# Patient Record
Sex: MALE | Race: WHITE | Employment: UNEMPLOYED | URBAN - METROPOLITAN AREA
[De-identification: names, ages, dates, MRNs, and addresses within clinical notes are randomized per-mention and may not be internally consistent; named-entity substitution may affect disease eponyms.]

---

## 2017-02-19 ENCOUNTER — APPOINTMENT (OUTPATIENT)
Dept: GENERAL RADIOLOGY | Age: 32
End: 2017-02-19
Payer: COMMERCIAL

## 2017-02-19 ENCOUNTER — HOSPITAL ENCOUNTER (EMERGENCY)
Age: 32
Discharge: HOME | End: 2017-02-20
Payer: COMMERCIAL

## 2017-02-19 VITALS
RESPIRATION RATE: 18 BRPM | HEART RATE: 91 BPM | HEIGHT: 71 IN | TEMPERATURE: 98 F | WEIGHT: 210 LBS | OXYGEN SATURATION: 95 % | BODY MASS INDEX: 29.4 KG/M2 | DIASTOLIC BLOOD PRESSURE: 101 MMHG | SYSTOLIC BLOOD PRESSURE: 162 MMHG

## 2017-02-19 DIAGNOSIS — S62.307A CLOSED NONDISPLACED FRACTURE OF FIFTH METACARPAL BONE OF LEFT HAND, UNSPECIFIED PORTION OF METACARPAL, INITIAL ENCOUNTER: Primary | ICD-10-CM

## 2017-02-19 PROCEDURE — 6370000000 HC RX 637 (ALT 250 FOR IP): Performed by: PHYSICIAN ASSISTANT

## 2017-02-19 PROCEDURE — 73130 X-RAY EXAM OF HAND: CPT

## 2017-02-19 PROCEDURE — 29125 APPL SHORT ARM SPLINT STATIC: CPT

## 2017-02-19 PROCEDURE — 99283 EMERGENCY DEPT VISIT LOW MDM: CPT

## 2017-02-19 RX ORDER — HYDROCODONE BITARTRATE AND ACETAMINOPHEN 5; 325 MG/1; MG/1
1-2 TABLET ORAL EVERY 6 HOURS PRN
Qty: 15 TABLET | Refills: 0 | Status: SHIPPED | OUTPATIENT
Start: 2017-02-19 | End: 2017-02-26

## 2017-02-19 RX ORDER — HYDROCODONE BITARTRATE AND ACETAMINOPHEN 5; 325 MG/1; MG/1
2 TABLET ORAL ONCE
Status: COMPLETED | OUTPATIENT
Start: 2017-02-19 | End: 2017-02-19

## 2017-02-19 RX ORDER — IBUPROFEN 800 MG/1
800 TABLET ORAL EVERY 8 HOURS PRN
Qty: 15 TABLET | Refills: 0 | Status: SHIPPED | OUTPATIENT
Start: 2017-02-19

## 2017-02-19 RX ADMIN — HYDROCODONE BITARTRATE AND ACETAMINOPHEN 2 TABLET: 5; 325 TABLET ORAL at 23:54

## 2017-02-19 ASSESSMENT — ENCOUNTER SYMPTOMS
ANAL BLEEDING: 0
VOMITING: 0
APNEA: 0
ABDOMINAL PAIN: 0
EYE DISCHARGE: 0
VOICE CHANGE: 0
NAUSEA: 0
BACK PAIN: 0
COUGH: 0
ABDOMINAL DISTENTION: 0
PHOTOPHOBIA: 0

## 2017-02-19 ASSESSMENT — PAIN DESCRIPTION - DESCRIPTORS: DESCRIPTORS: THROBBING

## 2017-02-19 ASSESSMENT — PAIN SCALES - GENERAL
PAINLEVEL_OUTOF10: 9
PAINLEVEL_OUTOF10: 10

## 2017-02-19 ASSESSMENT — PAIN DESCRIPTION - LOCATION: LOCATION: HAND

## 2017-02-19 ASSESSMENT — PAIN DESCRIPTION - ORIENTATION: ORIENTATION: LEFT

## 2017-02-19 ASSESSMENT — PAIN DESCRIPTION - PAIN TYPE: TYPE: ACUTE PAIN

## 2017-08-22 ENCOUNTER — HOSPITAL ENCOUNTER (EMERGENCY)
Age: 32
Discharge: HOME | End: 2017-08-22
Attending: EMERGENCY MEDICINE
Payer: MEDICAID

## 2017-08-22 VITALS
SYSTOLIC BLOOD PRESSURE: 155 MMHG | HEART RATE: 78 BPM | WEIGHT: 196 LBS | TEMPERATURE: 98.3 F | RESPIRATION RATE: 16 BRPM | HEIGHT: 71 IN | OXYGEN SATURATION: 97 % | DIASTOLIC BLOOD PRESSURE: 65 MMHG | BODY MASS INDEX: 27.44 KG/M2

## 2017-08-22 DIAGNOSIS — L70.0 ACNE VULGARIS: Primary | ICD-10-CM

## 2017-08-22 DIAGNOSIS — L02.92 DEEP FOLLICULITIS: ICD-10-CM

## 2017-08-22 PROCEDURE — 99282 EMERGENCY DEPT VISIT SF MDM: CPT

## 2017-08-22 RX ORDER — ADAPALENE AND BENZOYL PEROXIDE .1; 2.5 G/100G; G/100G
1 GEL TOPICAL 2 TIMES DAILY
Qty: 45 G | Refills: 0 | Status: SHIPPED | OUTPATIENT
Start: 2017-08-22

## 2017-08-22 RX ORDER — DOXYCYCLINE 100 MG/1
100 TABLET ORAL 2 TIMES DAILY
Qty: 20 TABLET | Refills: 0 | Status: SHIPPED | OUTPATIENT
Start: 2017-08-22 | End: 2017-09-01

## 2017-08-22 ASSESSMENT — ENCOUNTER SYMPTOMS
BACK PAIN: 0
NAUSEA: 0
SHORTNESS OF BREATH: 0
CHEST TIGHTNESS: 0
VOMITING: 0
BLOOD IN STOOL: 0
WHEEZING: 0
DIARRHEA: 0
COUGH: 0
TROUBLE SWALLOWING: 0
EYE PAIN: 0
ABDOMINAL PAIN: 0
RHINORRHEA: 0

## 2017-08-22 ASSESSMENT — PAIN DESCRIPTION - PROGRESSION: CLINICAL_PROGRESSION: GRADUALLY WORSENING

## 2017-08-22 ASSESSMENT — PAIN DESCRIPTION - DESCRIPTORS: DESCRIPTORS: BURNING;PRESSURE

## 2017-08-22 ASSESSMENT — PAIN SCALES - GENERAL: PAINLEVEL_OUTOF10: 6

## 2017-08-22 ASSESSMENT — PAIN DESCRIPTION - FREQUENCY: FREQUENCY: CONTINUOUS

## 2017-08-22 ASSESSMENT — PAIN DESCRIPTION - LOCATION: LOCATION: BACK

## 2017-08-22 ASSESSMENT — PAIN DESCRIPTION - ONSET: ONSET: PROGRESSIVE

## 2017-10-19 ENCOUNTER — APPOINTMENT (OUTPATIENT)
Dept: CT IMAGING | Age: 32
End: 2017-10-19
Payer: MEDICAID

## 2017-10-19 ENCOUNTER — HOSPITAL ENCOUNTER (EMERGENCY)
Age: 32
Discharge: HOME | End: 2017-10-19
Attending: EMERGENCY MEDICINE
Payer: MEDICAID

## 2017-10-19 VITALS
HEIGHT: 72 IN | DIASTOLIC BLOOD PRESSURE: 87 MMHG | RESPIRATION RATE: 16 BRPM | SYSTOLIC BLOOD PRESSURE: 144 MMHG | HEART RATE: 83 BPM | WEIGHT: 194 LBS | TEMPERATURE: 97.8 F | OXYGEN SATURATION: 98 % | BODY MASS INDEX: 26.28 KG/M2

## 2017-10-19 DIAGNOSIS — G44.209 TENSION HEADACHE: Primary | ICD-10-CM

## 2017-10-19 PROCEDURE — 70450 CT HEAD/BRAIN W/O DYE: CPT

## 2017-10-19 PROCEDURE — 2580000003 HC RX 258: Performed by: EMERGENCY MEDICINE

## 2017-10-19 PROCEDURE — 99283 EMERGENCY DEPT VISIT LOW MDM: CPT

## 2017-10-19 PROCEDURE — 6360000002 HC RX W HCPCS: Performed by: EMERGENCY MEDICINE

## 2017-10-19 PROCEDURE — 96374 THER/PROPH/DIAG INJ IV PUSH: CPT

## 2017-10-19 PROCEDURE — 96375 TX/PRO/DX INJ NEW DRUG ADDON: CPT

## 2017-10-19 RX ORDER — SODIUM CHLORIDE 9 MG/ML
INJECTION, SOLUTION INTRAVENOUS CONTINUOUS
Status: DISCONTINUED | OUTPATIENT
Start: 2017-10-19 | End: 2017-10-19 | Stop reason: HOSPADM

## 2017-10-19 RX ORDER — MORPHINE SULFATE 4 MG/ML
4 INJECTION, SOLUTION INTRAMUSCULAR; INTRAVENOUS
Status: DISCONTINUED | OUTPATIENT
Start: 2017-10-19 | End: 2017-10-19 | Stop reason: HOSPADM

## 2017-10-19 RX ORDER — ISOTRETINOIN 20 MG/1
20 CAPSULE ORAL 2 TIMES DAILY
COMMUNITY

## 2017-10-19 RX ORDER — PREDNISONE 20 MG/1
20 TABLET ORAL DAILY
COMMUNITY

## 2017-10-19 RX ORDER — ONDANSETRON 2 MG/ML
4 INJECTION INTRAMUSCULAR; INTRAVENOUS ONCE
Status: COMPLETED | OUTPATIENT
Start: 2017-10-19 | End: 2017-10-19

## 2017-10-19 RX ORDER — KETOROLAC TROMETHAMINE 30 MG/ML
30 INJECTION, SOLUTION INTRAMUSCULAR; INTRAVENOUS ONCE
Status: COMPLETED | OUTPATIENT
Start: 2017-10-19 | End: 2017-10-19

## 2017-10-19 RX ORDER — 0.9 % SODIUM CHLORIDE 0.9 %
500 INTRAVENOUS SOLUTION INTRAVENOUS ONCE
Status: COMPLETED | OUTPATIENT
Start: 2017-10-19 | End: 2017-10-19

## 2017-10-19 RX ADMIN — SODIUM CHLORIDE 500 ML: 9 INJECTION, SOLUTION INTRAVENOUS at 13:47

## 2017-10-19 RX ADMIN — KETOROLAC TROMETHAMINE 30 MG: 30 INJECTION, SOLUTION INTRAMUSCULAR at 13:46

## 2017-10-19 RX ADMIN — SODIUM CHLORIDE: 9 INJECTION, SOLUTION INTRAVENOUS at 14:22

## 2017-10-19 RX ADMIN — ONDANSETRON 4 MG: 2 INJECTION INTRAMUSCULAR; INTRAVENOUS at 13:46

## 2017-10-19 ASSESSMENT — ENCOUNTER SYMPTOMS
SORE THROAT: 0
CHEST TIGHTNESS: 0
EYES NEGATIVE: 1
SINUS PAIN: 0
ABDOMINAL PAIN: 0
ABDOMINAL DISTENTION: 0
DIARRHEA: 0
NAUSEA: 0
SHORTNESS OF BREATH: 0
COUGH: 0
VOMITING: 0
BLOOD IN STOOL: 0
EYE PAIN: 0
EYE DISCHARGE: 0
BACK PAIN: 0
RESPIRATORY NEGATIVE: 1
GASTROINTESTINAL NEGATIVE: 1
WHEEZING: 0

## 2017-10-19 ASSESSMENT — PAIN SCALES - GENERAL: PAINLEVEL_OUTOF10: 10

## 2017-10-19 ASSESSMENT — PAIN DESCRIPTION - LOCATION: LOCATION: HEAD

## 2017-10-19 ASSESSMENT — PAIN DESCRIPTION - DESCRIPTORS: DESCRIPTORS: ACHING

## 2017-10-19 ASSESSMENT — PAIN DESCRIPTION - FREQUENCY: FREQUENCY: CONTINUOUS

## 2017-10-19 ASSESSMENT — PAIN DESCRIPTION - PAIN TYPE: TYPE: ACUTE PAIN

## 2017-10-19 NOTE — ED TRIAGE NOTES
Patient presents to ED with c/o head pressure that radiates into right eye.  States that it has been going on for 4 days now

## 2017-10-19 NOTE — ED PROVIDER NOTES
[address] [place] [address] ED  eMERGENCY dEPARTMENT eNCOUnter      Pt Name: Parish Bolden  MRN: [id number]  [place] 1985  Date of evaluation: 10/19/2017  Provider: Cayetano Alford, [address] [place] [address]       Chief Complaint   Patient presents with    Headache     started about 4 days ago         HISTORY OF PRESENT ILLNESS   (Location/Symptom, Timing/Onset, Context/Setting, Quality, Duration, Modifying Factors, Severity)  Note limiting factors. Parish Bolden is a 28 y.o. male who presents to the emergency department Had pressure or headache most sides of his head is very sensitive to touch his scalp on the temporal region and around his ear to the base of his head. Bilaterally. The patient is on Accutane he has no visual symptomatology. He is also on prednisone for significant acne vulgaris. He is seeing a dermatologist who I did speak with on the phone. The only concern would've been for pseudotumor cerebri however he has no visual symptoms and he has no papilledema on my exam of his eyes funduscopic leave. HPI    Nursing Notes were reviewed. REVIEW OF SYSTEMS    (2-9 systems for level 4, 10 or more for level 5)     Review of Systems   Constitutional: Negative. Negative for chills and fever. HENT: Negative. Negative for ear pain, sinus pain and sore throat. Eyes: Negative. Negative for pain and discharge. Respiratory: Negative. Negative for cough, chest tightness, shortness of breath and wheezing. Cardiovascular: Negative. Negative for chest pain, palpitations and leg swelling. Gastrointestinal: Negative. Negative for abdominal distention, abdominal pain, blood in stool, diarrhea, nausea and vomiting. Endocrine: Negative. Negative for polydipsia and polyuria. Genitourinary: Negative. Negative for difficulty urinating, dysuria, flank pain, frequency, hematuria and urgency. Musculoskeletal: Negative. Negative for arthralgias, back pain, myalgias and neck pain.    Skin: Positive for rash. Negative for wound. Neurological: Positive for headaches. Negative for dizziness, seizures, syncope and weakness. Hematological: Negative. Negative for adenopathy. Does not bruise/bleed easily. Psychiatric/Behavioral: Negative. Negative for confusion, hallucinations, self-injury and suicidal ideas. All other systems reviewed and are negative. acne vulgaris  Sensitivity to scalp  Except as noted above the remainder of the review of systems was reviewed and negative. PAST MEDICAL HISTORY   History reviewed. No pertinent past medical history. SURGICAL HISTORY       Past Surgical History:   Procedure Laterality Date    HERNIA REPAIR           CURRENT MEDICATIONS       Previous Medications    ADAPALENE-BENZOYL PEROXIDE 0.1-2.5 % GEL    Apply 1 g topically 2 times daily    IBUPROFEN (ADVIL;MOTRIN) 800 MG TABLET    Take 1 tablet by mouth every 8 hours as needed for Pain    ISOTRETINOIN (ACCUTANE) 20 MG CHEMO CAPSULE    Take 20 mg by mouth 2 times daily    NAPROXEN (NAPROSYN) 500 MG TABLET    Take 1 tablet by mouth 2 times daily for 20 doses    PREDNISONE (DELTASONE) 20 MG TABLET    Take 20 mg by mouth daily       ALLERGIES     Review of patient's allergies indicates no known allergies. FAMILY HISTORY     History reviewed. No pertinent family history.        SOCIAL HISTORY       Social History     Social History    Marital status:      Spouse name: N/A    Number of children: N/A    Years of education: N/A     Social History Main Topics    Smoking status: Light Tobacco Smoker     Types: Cigarettes     Last attempt to quit: 12/12/2016    Smokeless tobacco: Never Used    Alcohol use 1.2 oz/week     2 Cans of beer per week      Comment: occasionally    Drug use: No    Sexual activity: Yes     Partners: Female     Other Topics Concern    None     Social History Narrative    None       SCREENINGS    Ezio Coma Scale  Eye Opening: Spontaneous  Best Verbal Response: Oriented  Best Motor Response: Obeys commands  [place] Coma Scale Score: 15        PHYSICAL EXAM    (up to 7 for level 4, 8 or more for level 5)     ED Triage Vitals [10/19/17 1254]   BP Temp Temp src Pulse Resp SpO2 Height Weight   (!) 147/72 97.8 °F (36.6 °C) -- 84 20 98 % 6' (1.829 m) 194 lb (88 kg)       Physical Exam   Constitutional: He is oriented to person, place, and time. He appears well-developed and well-nourished. HENT:   Head: Normocephalic and atraumatic. Eyes: Conjunctivae and EOM are normal. Pupils are equal, round, and reactive to light. Neck: Normal range of motion. Neck supple. Cardiovascular: Normal rate, regular rhythm, normal heart sounds and intact distal pulses. Exam reveals no friction rub. No murmur heard. Pulmonary/Chest: Effort normal and breath sounds normal. No respiratory distress. He has no wheezes. He has no rales. Abdominal: Soft. Bowel sounds are normal. He exhibits no distension. There is no tenderness. There is no rebound. Musculoskeletal: Normal range of motion. He exhibits no edema, tenderness or deformity. Neurological: He is alert and oriented to person, place, and time. No cranial nerve deficit. Coordination normal.   Skin: Skin is warm and dry. Rash noted. There is erythema. Psychiatric: He has a normal mood and affect. His behavior is normal.   Nursing note and vitals reviewed.  has significant acne vulgaris although he states this is better than what it had been before the Accutane and prednisone.     DIAGNOSTIC RESULTS     EKG: All EKG's are interpreted by the Emergency Department Physician who either signs or Co-signs this chart in the absence of a cardiologist.    None    RADIOLOGY:   Non-plain film images such as CT, Ultrasound and MRI are read by the radiologist. Plain radiographic images are visualized and preliminarily interpreted by the emergency physician with the below findings:    No acute intracranial pathology    Interpretation per the Radiologist below, if available at the time of this note:    CT Head WO Contrast   Final Result   NORMAL CT BRAIN WITHOUT CONTRAST. ED BEDSIDE ULTRASOUND:   Performed by ED Physician - none    LABS:  Labs Reviewed - No data to display    All other labs were within normal range or not returned as of this dictation. EMERGENCY DEPARTMENT COURSE and DIFFERENTIAL DIAGNOSIS/MDM:   Vitals:    Vitals:    10/19/17 1254   BP: (!) 147/72   Pulse: 84   Resp: 20   Temp: 97.8 °F (36.6 °C)   SpO2: 98%   Weight: 194 lb (88 kg)   Height: 6' (1.829 m)       Patient has CT negative of his head there is no acute intracranial pathology. The patient was treated for what I believe to be a tension cephalgia. The patient will have to stop the Accutane this is not pseudotumor cerebri. The patient will follow-up with his dermatologist in a week or 2 he is to call for that appointment. MDM    CRITICAL CARE TIME   Total Critical Care time was 0 minutes, excluding separately reportable procedures. There was a high probability of clinically significant/life threatening deterioration in the patient's condition which required my urgent intervention. CONSULTS:  None    PROCEDURES:  Unless otherwise noted below, none     Procedures    FINAL IMPRESSION      1.  Tension headache          DISPOSITION/PLAN   DISPOSITION     PATIENT REFERRED TO:  Elizabeth Silva MD  [address] [id number] [phone]    In 1 week        DISCHARGE MEDICATIONS:  New Prescriptions    No medications on file          (Please note that portions of this note were completed with a voice recognition program.  Efforts were made to edit the dictations but occasionally words are mis-transcribed.)    Tanner Koenig DO (electronically signed)  Attending Emergency Physician          Tanner Koenig DO  10/19/17 [address] [place] [address]  10/19/17 [facility]

## 2023-05-12 ENCOUNTER — HOSPITAL ENCOUNTER (EMERGENCY)
Age: 38
Discharge: LEFT BEFORE DISCHARGE | End: 2023-05-12
Payer: MEDICAID

## 2023-05-12 VITALS
WEIGHT: 185 LBS | HEIGHT: 72 IN | RESPIRATION RATE: 20 BRPM | TEMPERATURE: 98.5 F | BODY MASS INDEX: 25.06 KG/M2 | SYSTOLIC BLOOD PRESSURE: 132 MMHG | DIASTOLIC BLOOD PRESSURE: 93 MMHG | HEART RATE: 76 BPM | OXYGEN SATURATION: 95 %

## 2023-05-12 DIAGNOSIS — F10.920 ACUTE ALCOHOLIC INTOXICATION WITHOUT COMPLICATION (HCC): Primary | ICD-10-CM

## 2023-05-12 LAB
ALBUMIN SERPL-MCNC: 4.6 G/DL (ref 3.5–4.6)
ALP SERPL-CCNC: 219 U/L (ref 35–104)
ALT SERPL-CCNC: 161 U/L (ref 0–41)
AMPHETAMINES UR QL SCN>500 NG/ML: ABNORMAL
AMYLASE SERPL-CCNC: 85 U/L (ref 22–93)
ANION GAP SERPL CALCULATED.3IONS-SCNC: 13 MEQ/L (ref 9–15)
AST SERPL-CCNC: 255 U/L (ref 0–40)
BARBITURATES UR QL SCN>200 NG/ML: ABNORMAL
BASOPHILS # BLD: 0.1 K/UL (ref 0–0.1)
BASOPHILS NFR BLD: 1 % (ref 0.2–1.2)
BENZODIAZ UR QL SCN: ABNORMAL
BILIRUB SERPL-MCNC: 1 MG/DL (ref 0.2–0.7)
BILIRUB UR QL STRIP: NEGATIVE
BUN SERPL-MCNC: 8 MG/DL (ref 6–20)
CALCIUM SERPL-MCNC: 9.4 MG/DL (ref 8.5–9.9)
CHLORIDE SERPL-SCNC: 104 MEQ/L (ref 95–107)
CLARITY UR: CLEAR
CO2 SERPL-SCNC: 24 MEQ/L (ref 20–31)
COCAINE UR QL SCN: ABNORMAL
COLOR UR: YELLOW
CREAT SERPL-MCNC: 0.52 MG/DL (ref 0.7–1.2)
DRUG SCREEN COMMENT UR-IMP: ABNORMAL
EKG ATRIAL RATE: 84 BPM
EKG P AXIS: 28 DEGREES
EKG P-R INTERVAL: 116 MS
EKG Q-T INTERVAL: 382 MS
EKG QRS DURATION: 86 MS
EKG QTC CALCULATION (BAZETT): 451 MS
EKG R AXIS: 46 DEGREES
EKG T AXIS: 37 DEGREES
EKG VENTRICULAR RATE: 84 BPM
EOSINOPHIL # BLD: 0.1 K/UL (ref 0–0.5)
EOSINOPHIL NFR BLD: 1.6 % (ref 0.8–7)
ERYTHROCYTE [DISTWIDTH] IN BLOOD BY AUTOMATED COUNT: 14.7 % (ref 11.6–14.4)
ETHANOL PERCENT: 0.28 G/DL
ETHANOLAMINE SERPL-MCNC: 320 MG/DL (ref 0–0.08)
GLOBULIN SER CALC-MCNC: 3.7 G/DL (ref 2.3–3.5)
GLUCOSE SERPL-MCNC: 103 MG/DL (ref 70–99)
GLUCOSE UR STRIP-MCNC: NEGATIVE MG/DL
HCT VFR BLD AUTO: 44.1 % (ref 42–52)
HGB BLD-MCNC: 14.7 G/DL (ref 13.7–17.5)
HGB UR QL STRIP: NEGATIVE
IMM GRANULOCYTES # BLD: 0 K/UL
IMM GRANULOCYTES NFR BLD: 0.1 %
KETONES UR STRIP-MCNC: NEGATIVE MG/DL
LEUKOCYTE ESTERASE UR QL STRIP: NEGATIVE
LIPASE SERPL-CCNC: 40 U/L (ref 12–95)
LYMPHOCYTES # BLD: 2.7 K/UL (ref 1.3–3.6)
LYMPHOCYTES NFR BLD: 38.6 %
MAGNESIUM SERPL-MCNC: 2.1 MG/DL (ref 1.7–2.4)
MCH RBC QN AUTO: 31.9 PG (ref 25.7–32.2)
MCHC RBC AUTO-ENTMCNC: 33.3 % (ref 32.3–36.5)
MCV RBC AUTO: 95.7 FL (ref 79–92.2)
MONOCYTES # BLD: 0.5 K/UL (ref 0.3–0.8)
MONOCYTES NFR BLD: 7.2 % (ref 5.3–12.2)
NEUTROPHILS # BLD: 3.6 K/UL (ref 1.8–5.4)
NEUTS SEG NFR BLD: 51.5 % (ref 34–67.9)
NITRITE UR QL STRIP: NEGATIVE
OPIATES UR QL SCN: ABNORMAL
PCP UR QL SCN>25 NG/ML: ABNORMAL
PH UR STRIP: 6 [PH] (ref 5–9)
PLATELET # BLD AUTO: 209 K/UL (ref 163–337)
POTASSIUM SERPL-SCNC: 4.1 MEQ/L (ref 3.4–4.9)
PROT SERPL-MCNC: 8.3 G/DL (ref 6.3–8)
PROT UR STRIP-MCNC: NEGATIVE MG/DL
RBC # BLD AUTO: 4.61 M/UL (ref 4.63–6.08)
SODIUM SERPL-SCNC: 141 MEQ/L (ref 135–144)
SP GR UR STRIP: <=1.005 (ref 1–1.03)
THC UR QL SCN>50 NG/ML: POSITIVE
TRICYCLICS UR QL SCN: ABNORMAL
TROPONIN T SERPL-MCNC: <0.01 NG/ML (ref 0–0.01)
URINE REFLEX TO CULTURE: NORMAL
UROBILINOGEN UR STRIP-ACNC: 0.2 E.U./DL
WBC # BLD AUTO: 7.1 K/UL (ref 4.2–9)

## 2023-05-12 PROCEDURE — 96375 TX/PRO/DX INJ NEW DRUG ADDON: CPT

## 2023-05-12 PROCEDURE — 93005 ELECTROCARDIOGRAM TRACING: CPT

## 2023-05-12 PROCEDURE — 81003 URINALYSIS AUTO W/O SCOPE: CPT

## 2023-05-12 PROCEDURE — 84484 ASSAY OF TROPONIN QUANT: CPT

## 2023-05-12 PROCEDURE — 99284 EMERGENCY DEPT VISIT MOD MDM: CPT

## 2023-05-12 PROCEDURE — 82077 ASSAY SPEC XCP UR&BREATH IA: CPT

## 2023-05-12 PROCEDURE — 96365 THER/PROPH/DIAG IV INF INIT: CPT

## 2023-05-12 PROCEDURE — 2500000003 HC RX 250 WO HCPCS: Performed by: NURSE PRACTITIONER

## 2023-05-12 PROCEDURE — 6360000002 HC RX W HCPCS: Performed by: NURSE PRACTITIONER

## 2023-05-12 PROCEDURE — 80074 ACUTE HEPATITIS PANEL: CPT

## 2023-05-12 PROCEDURE — 80053 COMPREHEN METABOLIC PANEL: CPT

## 2023-05-12 PROCEDURE — 85025 COMPLETE CBC W/AUTO DIFF WBC: CPT

## 2023-05-12 PROCEDURE — 36415 COLL VENOUS BLD VENIPUNCTURE: CPT

## 2023-05-12 PROCEDURE — 80306 DRUG TEST PRSMV INSTRMNT: CPT

## 2023-05-12 PROCEDURE — 96366 THER/PROPH/DIAG IV INF ADDON: CPT

## 2023-05-12 PROCEDURE — 83690 ASSAY OF LIPASE: CPT

## 2023-05-12 PROCEDURE — 82150 ASSAY OF AMYLASE: CPT

## 2023-05-12 PROCEDURE — 83735 ASSAY OF MAGNESIUM: CPT

## 2023-05-12 PROCEDURE — 2580000003 HC RX 258: Performed by: NURSE PRACTITIONER

## 2023-05-12 RX ORDER — LORAZEPAM 2 MG/ML
0.5 INJECTION INTRAMUSCULAR ONCE
Status: DISCONTINUED | OUTPATIENT
Start: 2023-05-12 | End: 2023-05-12

## 2023-05-12 RX ORDER — LORAZEPAM 2 MG/ML
1 INJECTION INTRAMUSCULAR ONCE
Status: COMPLETED | OUTPATIENT
Start: 2023-05-12 | End: 2023-05-12

## 2023-05-12 RX ADMIN — FOLIC ACID: 5 INJECTION, SOLUTION INTRAMUSCULAR; INTRAVENOUS; SUBCUTANEOUS at 15:32

## 2023-05-12 RX ADMIN — LORAZEPAM 1 MG: 2 INJECTION INTRAMUSCULAR; INTRAVENOUS at 15:31

## 2023-05-12 ASSESSMENT — ENCOUNTER SYMPTOMS
SORE THROAT: 0
VOICE CHANGE: 0
VOMITING: 0
EYE PAIN: 0
CHEST TIGHTNESS: 0
EYE ITCHING: 0
STRIDOR: 0
EYE DISCHARGE: 0
COUGH: 0
SHORTNESS OF BREATH: 0
NAUSEA: 0
WHEEZING: 0
ALLERGIC/IMMUNOLOGIC NEGATIVE: 1
BLOOD IN STOOL: 0
CHOKING: 0
CONSTIPATION: 0
APNEA: 0
SINUS PRESSURE: 0
COLOR CHANGE: 0
ABDOMINAL PAIN: 0
PHOTOPHOBIA: 0
SINUS PAIN: 0
RHINORRHEA: 0
ABDOMINAL DISTENTION: 0
EYE REDNESS: 0
DIARRHEA: 0
BACK PAIN: 0
TROUBLE SWALLOWING: 0
FACIAL SWELLING: 0

## 2023-05-12 ASSESSMENT — PAIN SCALES - GENERAL: PAINLEVEL_OUTOF10: 8

## 2023-05-12 ASSESSMENT — PAIN - FUNCTIONAL ASSESSMENT: PAIN_FUNCTIONAL_ASSESSMENT: 0-10

## 2023-05-12 ASSESSMENT — PAIN DESCRIPTION - LOCATION: LOCATION: CHEST;OTHER (COMMENT)

## 2023-05-12 ASSESSMENT — PAIN DESCRIPTION - PAIN TYPE: TYPE: ACUTE PAIN;CHRONIC PAIN

## 2023-05-12 ASSESSMENT — PAIN DESCRIPTION - FREQUENCY: FREQUENCY: CONTINUOUS

## 2023-05-12 NOTE — ED TRIAGE NOTES
Pt to ER with c/o ETOH withdrawal, Pt states last use cocaine days ago, THC use daily, Pt states he called [facility] and they advised he go to the nearest facility to facilitate transfer to them. States last inpatient treatment was Nov. 2019. Pt denies suicidal or homicidal ideation. States last drink just prior to arrival. Pt states he drove himself to the ER.  Patient states he has been homeless since March 31st.

## 2023-05-12 NOTE — ED NOTES
[facility] contacted to initiate assistance to help facilitate patient with rehab per patient request. Spoke with Sacha Gilman and she informed me no one available at this time. She provided me with a contact number to Margareth from [facility] who could potentially help. After speaking with Margareth she is unable to provide support for patient to get into rehab but she did provide me with another  named Milton Brown from [facility] that should be able to help facilitate patient to transition into a rehab facility. Awaiting to hear back from Milton Brown at this time. Meanwhile a representative from [address] [place] [address] arrived and is currently working on care transition.      Ismael Matt RN  05/12/23 4365

## 2023-05-12 NOTE — ED PROVIDER NOTES
MCV 95.7 (*)     RDW 14.7 (*)     All other components within normal limits   COMPREHENSIVE METABOLIC PANEL - Abnormal; Notable for the following components:    Glucose 103 (*)     Creatinine 0.52 (*)     Total Protein 8.3 (*)     Total Bilirubin 1.0 (*)     Alkaline Phosphatase 219 (*)      (*)      (*)     Globulin 3.7 (*)     All other components within normal limits   URINE DRUG SCREEN, COMPREHENSIVE - Abnormal; Notable for the following components:    Cannabinoid Scrn, Ur POSITIVE (*)     All other components within normal limits   MAGNESIUM   LIPASE   AMYLASE   ETHANOL   URINALYSIS WITH REFLEX TO CULTURE   TROPONIN   HEPATITIS PANEL, ACUTE       All other labs were within normal range or not returned as of this dictation. EMERGENCY DEPARTMENT COURSE and DIFFERENTIAL DIAGNOSIS/MDM:   Vitals:    Vitals:    05/12/23 1605 05/12/23 1700 05/12/23 1800 05/12/23 1830   BP: 115/79 (!) 119/91 (!) 127/91 (!) 132/93   Pulse: 78 76 80 76   Resp:       Temp:       TempSrc:       SpO2: 94% 96% 96% 95%   Weight:       Height:           MDM      45 y.o. male presents to the ED for evaluation of alcohol withdrawal. BP elevated on arrival at 148/102. EKG shows NSR, HR 84, normal axis, normal intervals, no acute ST/T wave changes. Pt given 1L banana bag, Ativan and Zofran IV. Labs show , , total bili 1.0, ethanol 320, tox + marijuana, troponin negative. Acute hepatitis panel added. Pt reassessed and resting quietly, /79. Nursing staff spent 2+ hours contacting different resources to help pt get into a detox facility. [facility] rep was in the ED to speak with pt. While trying to facilitate getting him into detox, pt removed his own IV and left the ED. Nursing staff attempted to contact him and have him return to the ED without success. Local authorities were notified that pt left the ED while under the influence.         REASSESSMENT          CRITICAL CARE TIME   Total Critical Care

## 2023-05-12 NOTE — ED NOTES
Rajat Romero from [facility] called and provided with patients cell number and will reach out to patient to obtain more information.      Christiane Art RN  05/12/23 9932

## 2023-05-12 NOTE — ED NOTES
Called [facility] and spoke with Felecia Key. Stays she will give us a call  back.       Sushila Bejarano  05/12/23 0843

## 2023-05-12 NOTE — ED NOTES
[facility] associate left and stated that the plan as of right now is to have [facility] finalize admission process and potentially admit to there facility and they do offer transport.  If that falls through, he states to reach back out to him at [address] and he will continue to do whatever he can to facilitate plan or care for patient     Christiane Art RN  05/12/23 3320

## 2023-05-12 NOTE — ED NOTES
[facility].       Nikki Mandujano  05/12/23 9575

## 2023-05-12 NOTE — ED NOTES
Pt not found in room. IV removed and blood on floor. Per registration they did see the patient leave the ED and get in his car.       Foster Wilson RN  05/12/23 1946

## 2023-05-13 LAB
HAV IGM SER IA-ACNC: NONREACTIVE
HEPATITIS B CORE IGM ANTIBODY: NONREACTIVE
HEPATITIS B SURF AG,XHBAGS: NONREACTIVE
HEPATITIS C ANTIBODY: NONREACTIVE

## 2023-08-12 ENCOUNTER — HOSPITAL ENCOUNTER (EMERGENCY)
Age: 38
Discharge: ANOTHER HOSPITAL | End: 2023-08-12
Attending: EMERGENCY MEDICINE
Payer: OTHER GOVERNMENT

## 2023-08-12 ENCOUNTER — APPOINTMENT (OUTPATIENT)
Dept: CT IMAGING | Age: 38
End: 2023-08-12
Payer: OTHER GOVERNMENT

## 2023-08-12 ENCOUNTER — APPOINTMENT (OUTPATIENT)
Dept: GENERAL RADIOLOGY | Age: 38
End: 2023-08-12
Payer: OTHER GOVERNMENT

## 2023-08-12 VITALS
TEMPERATURE: 98.2 F | HEART RATE: 108 BPM | SYSTOLIC BLOOD PRESSURE: 152 MMHG | DIASTOLIC BLOOD PRESSURE: 94 MMHG | RESPIRATION RATE: 16 BRPM | HEIGHT: 71 IN | OXYGEN SATURATION: 98 % | BODY MASS INDEX: 26.6 KG/M2 | WEIGHT: 190 LBS

## 2023-08-12 DIAGNOSIS — F29 PSYCHOSIS, UNSPECIFIED PSYCHOSIS TYPE (HCC): ICD-10-CM

## 2023-08-12 DIAGNOSIS — F12.90 MARIJUANA USE: Primary | ICD-10-CM

## 2023-08-12 LAB
ALBUMIN SERPL-MCNC: 4.8 G/DL (ref 3.5–4.6)
ALP SERPL-CCNC: 108 U/L (ref 35–104)
ALT SERPL-CCNC: 23 U/L (ref 0–41)
AMPHET UR QL SCN: ABNORMAL
ANION GAP SERPL CALCULATED.3IONS-SCNC: 19 MEQ/L (ref 9–15)
APAP SERPL-MCNC: <5 UG/ML (ref 10–30)
AST SERPL-CCNC: 35 U/L (ref 0–40)
BACTERIA URNS QL MICRO: NEGATIVE /HPF
BARBITURATES UR QL SCN: ABNORMAL
BASOPHILS # BLD: 0 K/UL (ref 0–0.2)
BASOPHILS NFR BLD: 0.2 %
BENZODIAZ UR QL SCN: ABNORMAL
BILIRUB SERPL-MCNC: 1.3 MG/DL (ref 0.2–0.7)
BILIRUB UR QL STRIP: ABNORMAL
BUN SERPL-MCNC: 19 MG/DL (ref 6–20)
CALCIUM SERPL-MCNC: 9.8 MG/DL (ref 8.5–9.9)
CANNABINOIDS UR QL SCN: POSITIVE
CHLORIDE SERPL-SCNC: 96 MEQ/L (ref 95–107)
CHOLEST SERPL-MCNC: 172 MG/DL (ref 0–199)
CK SERPL-CCNC: 208 U/L (ref 0–190)
CLARITY UR: ABNORMAL
CO2 SERPL-SCNC: 20 MEQ/L (ref 20–31)
COCAINE UR QL SCN: ABNORMAL
COLOR UR: ABNORMAL
CREAT SERPL-MCNC: 1.24 MG/DL (ref 0.7–1.2)
DRUG SCREEN COMMENT UR-IMP: ABNORMAL
EKG ATRIAL RATE: 97 BPM
EKG P AXIS: 46 DEGREES
EKG P-R INTERVAL: 98 MS
EKG Q-T INTERVAL: 354 MS
EKG QRS DURATION: 80 MS
EKG QTC CALCULATION (BAZETT): 449 MS
EKG R AXIS: 38 DEGREES
EKG T AXIS: 35 DEGREES
EKG VENTRICULAR RATE: 97 BPM
EOSINOPHIL # BLD: 0 K/UL (ref 0–0.7)
EOSINOPHIL NFR BLD: 0.2 %
EPI CELLS #/AREA URNS AUTO: ABNORMAL /HPF (ref 0–5)
ERYTHROCYTE [DISTWIDTH] IN BLOOD BY AUTOMATED COUNT: 13.3 % (ref 11.5–14.5)
ETHANOL PERCENT: NORMAL G/DL
ETHANOLAMINE SERPL-MCNC: <10 MG/DL (ref 0–0.08)
FENTANYL SCREEN, URINE: ABNORMAL
GLOBULIN SER CALC-MCNC: 2.9 G/DL (ref 2.3–3.5)
GLUCOSE SERPL-MCNC: 70 MG/DL (ref 70–99)
GLUCOSE UR STRIP-MCNC: NEGATIVE MG/DL
HCT VFR BLD AUTO: 39.9 % (ref 42–52)
HDLC SERPL-MCNC: 61 MG/DL (ref 40–59)
HGB BLD-MCNC: 13.3 G/DL (ref 14–18)
HGB UR QL STRIP: NEGATIVE
HYALINE CASTS #/AREA URNS AUTO: ABNORMAL /HPF (ref 0–5)
KETONES UR STRIP-MCNC: >=80 MG/DL
LDLC SERPL CALC-MCNC: 72 MG/DL (ref 0–129)
LEUKOCYTE ESTERASE UR QL STRIP: ABNORMAL
LYMPHOCYTES # BLD: 1.3 K/UL (ref 1–4.8)
LYMPHOCYTES NFR BLD: 15.7 %
MAGNESIUM SERPL-MCNC: 2 MG/DL (ref 1.7–2.4)
MCH RBC QN AUTO: 30.6 PG (ref 27–31.3)
MCHC RBC AUTO-ENTMCNC: 33.4 % (ref 33–37)
MCV RBC AUTO: 91.5 FL (ref 79–92.2)
METHADONE UR QL SCN: ABNORMAL
MONOCYTES # BLD: 0.4 K/UL (ref 0.2–0.8)
MONOCYTES NFR BLD: 5.2 %
NEUTROPHILS # BLD: 6.6 K/UL (ref 1.4–6.5)
NEUTS SEG NFR BLD: 78.7 %
NITRITE UR QL STRIP: NEGATIVE
OPIATES UR QL SCN: ABNORMAL
OXYCODONE UR QL SCN: ABNORMAL
PCP UR QL SCN: ABNORMAL
PH UR STRIP: 6 [PH] (ref 5–9)
PLATELET # BLD AUTO: 141 K/UL (ref 130–400)
POTASSIUM SERPL-SCNC: 3.7 MEQ/L (ref 3.4–4.9)
PROPOXYPH UR QL SCN: ABNORMAL
PROT SERPL-MCNC: 7.7 G/DL (ref 6.3–8)
PROT UR STRIP-MCNC: 100 MG/DL
RBC # BLD AUTO: 4.36 M/UL (ref 4.7–6.1)
RBC #/AREA URNS AUTO: ABNORMAL /HPF (ref 0–5)
SALICYLATES SERPL-MCNC: <0.3 MG/DL (ref 15–30)
SARS-COV-2 RDRP RESP QL NAA+PROBE: NOT DETECTED
SARS-COV-2 RNA NPH QL NAA+PROBE: NOT DETECTED
SODIUM SERPL-SCNC: 135 MEQ/L (ref 135–144)
SP GR UR STRIP: 1.03 (ref 1–1.03)
TRIGL SERPL-MCNC: 193 MG/DL (ref 0–150)
TSH REFLEX: 2.04 UIU/ML (ref 0.44–3.86)
URINE REFLEX TO CULTURE: ABNORMAL
UROBILINOGEN UR STRIP-ACNC: 1 E.U./DL
WBC # BLD AUTO: 8.4 K/UL (ref 4.8–10.8)
WBC #/AREA URNS AUTO: ABNORMAL /HPF (ref 0–5)

## 2023-08-12 PROCEDURE — 80179 DRUG ASSAY SALICYLATE: CPT

## 2023-08-12 PROCEDURE — 73630 X-RAY EXAM OF FOOT: CPT

## 2023-08-12 PROCEDURE — 2580000003 HC RX 258: Performed by: EMERGENCY MEDICINE

## 2023-08-12 PROCEDURE — 36415 COLL VENOUS BLD VENIPUNCTURE: CPT

## 2023-08-12 PROCEDURE — 82550 ASSAY OF CK (CPK): CPT

## 2023-08-12 PROCEDURE — 83735 ASSAY OF MAGNESIUM: CPT

## 2023-08-12 PROCEDURE — 6370000000 HC RX 637 (ALT 250 FOR IP): Performed by: EMERGENCY MEDICINE

## 2023-08-12 PROCEDURE — 96361 HYDRATE IV INFUSION ADD-ON: CPT

## 2023-08-12 PROCEDURE — 82077 ASSAY SPEC XCP UR&BREATH IA: CPT

## 2023-08-12 PROCEDURE — 6370000000 HC RX 637 (ALT 250 FOR IP): Performed by: PHYSICIAN ASSISTANT

## 2023-08-12 PROCEDURE — 81001 URINALYSIS AUTO W/SCOPE: CPT

## 2023-08-12 PROCEDURE — 80143 DRUG ASSAY ACETAMINOPHEN: CPT

## 2023-08-12 PROCEDURE — 85025 COMPLETE CBC W/AUTO DIFF WBC: CPT

## 2023-08-12 PROCEDURE — 84443 ASSAY THYROID STIM HORMONE: CPT

## 2023-08-12 PROCEDURE — 93010 ELECTROCARDIOGRAM REPORT: CPT | Performed by: INTERNAL MEDICINE

## 2023-08-12 PROCEDURE — 87635 SARS-COV-2 COVID-19 AMP PRB: CPT

## 2023-08-12 PROCEDURE — 80061 LIPID PANEL: CPT

## 2023-08-12 PROCEDURE — 93005 ELECTROCARDIOGRAM TRACING: CPT | Performed by: EMERGENCY MEDICINE

## 2023-08-12 PROCEDURE — 96360 HYDRATION IV INFUSION INIT: CPT

## 2023-08-12 PROCEDURE — 6370000000 HC RX 637 (ALT 250 FOR IP): Performed by: STUDENT IN AN ORGANIZED HEALTH CARE EDUCATION/TRAINING PROGRAM

## 2023-08-12 PROCEDURE — 73560 X-RAY EXAM OF KNEE 1 OR 2: CPT

## 2023-08-12 PROCEDURE — 80053 COMPREHEN METABOLIC PANEL: CPT

## 2023-08-12 PROCEDURE — 70450 CT HEAD/BRAIN W/O DYE: CPT

## 2023-08-12 PROCEDURE — 73610 X-RAY EXAM OF ANKLE: CPT

## 2023-08-12 PROCEDURE — 80307 DRUG TEST PRSMV CHEM ANLYZR: CPT

## 2023-08-12 PROCEDURE — 99285 EMERGENCY DEPT VISIT HI MDM: CPT

## 2023-08-12 RX ORDER — FAMOTIDINE 20 MG/1
20 TABLET, FILM COATED ORAL ONCE
Status: COMPLETED | OUTPATIENT
Start: 2023-08-12 | End: 2023-08-12

## 2023-08-12 RX ORDER — HYDROXYZINE PAMOATE 50 MG/1
50 CAPSULE ORAL ONCE
Status: COMPLETED | OUTPATIENT
Start: 2023-08-12 | End: 2023-08-12

## 2023-08-12 RX ORDER — ACETAMINOPHEN 500 MG
1000 TABLET ORAL
Status: COMPLETED | OUTPATIENT
Start: 2023-08-12 | End: 2023-08-12

## 2023-08-12 RX ORDER — DULOXETIN HYDROCHLORIDE 60 MG/1
60 CAPSULE, DELAYED RELEASE ORAL DAILY
COMMUNITY

## 2023-08-12 RX ORDER — TRAMADOL HYDROCHLORIDE 50 MG/1
50 TABLET ORAL ONCE
Status: COMPLETED | OUTPATIENT
Start: 2023-08-12 | End: 2023-08-12

## 2023-08-12 RX ORDER — IBUPROFEN 600 MG/1
600 TABLET ORAL ONCE
Status: COMPLETED | OUTPATIENT
Start: 2023-08-12 | End: 2023-08-12

## 2023-08-12 RX ORDER — ACETAMINOPHEN 325 MG/1
650 TABLET ORAL ONCE
Status: COMPLETED | OUTPATIENT
Start: 2023-08-12 | End: 2023-08-12

## 2023-08-12 RX ORDER — 0.9 % SODIUM CHLORIDE 0.9 %
2000 INTRAVENOUS SOLUTION INTRAVENOUS ONCE
Status: COMPLETED | OUTPATIENT
Start: 2023-08-12 | End: 2023-08-12

## 2023-08-12 RX ORDER — HALOPERIDOL 5 MG/1
5 TABLET ORAL ONCE
Status: COMPLETED | OUTPATIENT
Start: 2023-08-12 | End: 2023-08-12

## 2023-08-12 RX ORDER — HYDROCHLOROTHIAZIDE 12.5 MG/1
12.5 CAPSULE, GELATIN COATED ORAL DAILY
COMMUNITY

## 2023-08-12 RX ORDER — NALTREXONE HYDROCHLORIDE 50 MG/1
50 TABLET, FILM COATED ORAL DAILY
COMMUNITY

## 2023-08-12 RX ORDER — PRAZOSIN HYDROCHLORIDE 2 MG/1
4 CAPSULE ORAL NIGHTLY
COMMUNITY

## 2023-08-12 RX ORDER — HYDROXYZINE PAMOATE 50 MG/1
50 CAPSULE ORAL 4 TIMES DAILY PRN
COMMUNITY

## 2023-08-12 RX ORDER — LORAZEPAM 1 MG/1
2 TABLET ORAL ONCE
Status: COMPLETED | OUTPATIENT
Start: 2023-08-12 | End: 2023-08-12

## 2023-08-12 RX ADMIN — HYDROXYZINE PAMOATE 50 MG: 50 CAPSULE ORAL at 18:28

## 2023-08-12 RX ADMIN — ACETAMINOPHEN 650 MG: 325 TABLET ORAL at 02:30

## 2023-08-12 RX ADMIN — SODIUM CHLORIDE 2000 ML: 9 INJECTION, SOLUTION INTRAVENOUS at 02:09

## 2023-08-12 RX ADMIN — LORAZEPAM 2 MG: 1 TABLET ORAL at 07:24

## 2023-08-12 RX ADMIN — FAMOTIDINE 20 MG: 20 TABLET, FILM COATED ORAL at 05:13

## 2023-08-12 RX ADMIN — Medication: at 05:13

## 2023-08-12 RX ADMIN — IBUPROFEN 600 MG: 600 TABLET, FILM COATED ORAL at 05:13

## 2023-08-12 RX ADMIN — HALOPERIDOL 5 MG: 5 TABLET ORAL at 18:29

## 2023-08-12 RX ADMIN — ACETAMINOPHEN 1000 MG: 500 TABLET ORAL at 15:26

## 2023-08-12 RX ADMIN — TRAMADOL HYDROCHLORIDE 50 MG: 50 TABLET ORAL at 07:27

## 2023-08-12 ASSESSMENT — PAIN DESCRIPTION - LOCATION
LOCATION: GENERALIZED
LOCATION: FOOT

## 2023-08-12 ASSESSMENT — PAIN SCALES - GENERAL
PAINLEVEL_OUTOF10: 5
PAINLEVEL_OUTOF10: 8
PAINLEVEL_OUTOF10: 10

## 2023-08-12 ASSESSMENT — PAIN DESCRIPTION - DESCRIPTORS: DESCRIPTORS: ACHING

## 2023-08-12 ASSESSMENT — PAIN DESCRIPTION - ORIENTATION: ORIENTATION: LEFT

## 2023-08-12 ASSESSMENT — PAIN DESCRIPTION - FREQUENCY: FREQUENCY: CONTINUOUS

## 2023-08-12 ASSESSMENT — PAIN DESCRIPTION - ONSET: ONSET: ON-GOING

## 2023-08-12 ASSESSMENT — PAIN - FUNCTIONAL ASSESSMENT: PAIN_FUNCTIONAL_ASSESSMENT: 0-10

## 2023-08-12 NOTE — ED NOTES
Patient on phone speaking with family. Thought process disorganized with rapid, pressured and tangential speech. Intrusive with staff and peers at times. Mood labile and elated at times. Patient believes a peer on the unit is an acquaintance of his and asking staff the last name of peer. Limits set and patient directed to stay within his area. Verbalizes understanding.       Sydnie Escobedo RN  08/12/23 2546

## 2023-08-12 NOTE — ED PROVIDER NOTES
[facility] ED  EMERGENCY DEPARTMENT ENCOUNTER      Pt Name: Abraham Meade  MRN: [id number]  [address] [facility] 1985  Date of evaluation: 8/12/2023  Provider: Cristina Renteria MD    CHIEF COMPLAINT       Chief Complaint   Patient presents with    Mental Health Problem         HISTORY OF PRESENT ILLNESS   (Location/Symptom, Timing/Onset, Context/Setting, Quality, Duration, Modifying Factors, Severity)  Note limiting factors. Abraham Meade is a 45 y.o. male who presents to the emergency department via EMS for psychiatric evaluation. Initial history comes from patient. He states that he called the police because\" 6 hillbillies were chasing me, I have a house in [facility] and they want to burn it down. \". He states that he does have a history of anxiety, depression, PTSD. He denies history of psychosis or previous suicide attempts. Denies SI/HI. The patient will begin speaking and states that he was in the [employer]. He says he fell and scraped his right knee and has left foot pain. Denies drug or alcohol use tonight. No anticoagulation use. HPI  Chart review significant for alcohol abuse and alcoholic cirrhosis/hepatitis and hypertension  Nursing Notes were reviewed. REVIEW OF SYSTEMS    (2-9 systems for level 4, 10 or more for level 5)     Review of Systems   Unable to perform ROS: Psychiatric disorder     Except as noted above the remainder of the review of systems was reviewed and negative.        PAST MEDICAL HISTORY     Past Medical History:   Diagnosis Date    Epididymitis     EtOH dependence ([address])     Hypertension          SURGICAL HISTORY       Past Surgical History:   Procedure Laterality Date    HERNIA REPAIR           CURRENT MEDICATIONS       Previous Medications    DULOXETINE (CYMBALTA) 60 MG EXTENDED RELEASE CAPSULE    Take 1 capsule by mouth daily    HYDROCHLOROTHIAZIDE (MICROZIDE) 12.5 MG CAPSULE    Take 1 capsule by mouth daily    HYDROXYZINE PAMOATE (VISTARIL) 50 MG CAPSULE    Take 1 Affect: Mood is anxious. Speech: Speech is rapid and pressured. Thought Content: Thought content is paranoid and delusional.       DIAGNOSTIC RESULTS     EKG: All EKG's are interpreted by the Emergency Department Physician who either signs or Co-signs this chart in the absence of a cardiologist.    Rhythm, rate 97, normal axis, ME interval 98 ms, QTc 449 ms, no delta wave, no STEMI    RADIOLOGY:   Non-plain film images such as CT, Ultrasound and MRI are read by the radiologist. Plain radiographic images are visualized and preliminarily interpreted by the emergency physician with the below findings:    No displaced fracture-my interpretation/visualization    Interpretation per the Radiologist below, if available at the time of this note:    XR FOOT LEFT (MIN 3 VIEWS)   Final Result   No acute osseous abnormality. XR ANKLE LEFT (MIN 3 VIEWS)   Final Result   No acute osseous abnormality. XR KNEE RIGHT (1-2 VIEWS)   Final Result   No acute abnormality of the knee.          CT HEAD WO CONTRAST    (Results Pending)     No ICH per statrad    ED BEDSIDE ULTRASOUND:   Performed by ED Physician - none    LABS:  Labs Reviewed   CK - Abnormal; Notable for the following components:       Result Value    Total  (*)     All other components within normal limits   COMPREHENSIVE METABOLIC PANEL - Abnormal; Notable for the following components:    Anion Gap 19 (*)     Creatinine 1.24 (*)     Albumin 4.8 (*)     Total Bilirubin 1.3 (*)     Alkaline Phosphatase 108 (*)     All other components within normal limits   LIPID PANEL - Abnormal; Notable for the following components:    Triglycerides 193 (*)     HDL 61 (*)     All other components within normal limits   SALICYLATE LEVEL - Abnormal; Notable for the following components:    Salicylate, Serum <3.5 (*)     All other components within normal limits   URINALYSIS WITH REFLEX TO CULTURE - Abnormal; Notable for the following components:    Color,

## 2023-08-12 NOTE — ED NOTES
Patient complaint of foot pain and requesting pain medicine for foot pain. Zone 1 ED provider [address] notified.       Roxanne Devries RN  08/12/23 0872

## 2023-08-12 NOTE — ED NOTES
Patient transferred to [facility] bed 1.  Oriented to unit and provided oral fluids per request.      Missy Hanna RN  08/12/23 9242

## 2023-08-12 NOTE — ED NOTES
Attempts made to call nurse to nurse to [facility]. Wrong extension provided. Message left for [facility] intake to return call.       Emiliano Durand RN  08/12/23 1342

## 2023-08-12 NOTE — ED NOTES
REcieved call from [facility] intake- [phone] ext. [phone]. They reqeust a Covid PCR results , EKG, and vitals. Be faxed. They askl for reutmnr call with vitals. Mamadou Charge nurse verified. Kolleen Staple Coralee Angelucci  08/12/23 [address] [place] [address]  08/12/23 4372

## 2023-08-12 NOTE — ED NOTES
Verified with lab that PCR Covid test  needed. They will run it -[employer]. Yessica Chun, [address] [place] [address]  08/12/23 1648 Perry Chun, [address] [place] [address]  08/12/23 3770

## 2023-08-12 NOTE — ED NOTES
Patient had to be redirected away from female peers bed area. He was difficult to redirect. Thought proscesses appeared slowed. Eva Mayer  08/12/23 3020

## 2023-08-12 NOTE — ED NOTES
Call placed to [facility] and transport ETA 90 minutes.       Bam Hunter RN  08/12/23 NERI Mckeon  08/12/23 5321

## 2023-08-12 NOTE — ED NOTES
Pt continues to have paranoia and delusional thoughts. Pt believes there is a female named Alberto Rubio that is trapped in another patients mattress. He stated\"cant you hear her talking? \" When asked why someone would be in the mattress he stated \"Because she is a magician\" Speech rapid and flight of ideas. Keeps trying to go to patients beds and redirected and able to follow with redirection. Medication requested.      Joanna Javed RN  08/12/23 0186

## 2023-08-12 NOTE — ED NOTES
Report given to Mimi Blackwell at the [facility] ED. Call placed to [facility] and spoke with dispatcher Symone, transport ETA 2-3 hours.       Sami Manuel RN  08/12/23 6247

## 2023-08-12 NOTE — ED NOTES
Patient updated on plan of care and transfer to [facility]. Patient accepting of information provided and verbalizes understanding.       Ananias Habermann, RN  08/12/23 9665

## 2023-08-12 NOTE — ED NOTES
On the phone with [place] at this time discussing vitals and Covid for the patient at this time.       Sharee Ceballos RN  08/12/23 6963

## 2023-08-12 NOTE — ED NOTES
Provisional Diagnosis:     Unspecified Psychosis     Psychosocial and Contextual Factors:     Recent Move     C-SSRS Summary:      C-SSRS Suicide Screening 1) Within the past month, have you wished you were dead or wished you could go to sleep and not wake up? : No  2) Have you actually had any thoughts of killing yourself? : No     Substance Abuse: Positive for Cannabinoid    Present Suicidal Behavior:      C-SSRS Suicide Frequent Screening 2) Since you were last asked, have you actually had thoughts about killing yourself? : No  6) Since you were last asked, have you done anything, started to do anything, or prepared to do anything to end your life?: No  Risk of Suicide: No Risk     Past Suicidal Behavior:     Verbal: Denies     Attempt: Denies      Self-Injurious/Self-Mutilation: Denies      Violence Current or Past: Denies      Trauma Identified:  Denies    Protective Factors:    Compliance with medications  Compliance with follow ups      Risk Factors:    Stress related to recent move. New onset psychosis       Clinical Summary:      Per Dr. Micheline Santamaria:    Claudia Shaver is a 45 y.o. male who presents to the emergency department via EMS for psychiatric evaluation. Initial history comes from patient. He states that he called the police because\" 6 hillbillies were chasing me, I have a house in [facility] and they want to burn it down. \". He states that he does have a history of anxiety, depression, PTSD. He denies history of psychosis or previous suicide attempts. Denies SI/HI. The patient will begin speaking and states that he was in the [employer]. He says he fell and scraped his right knee and has left foot pain. Denies drug or alcohol use tonight. No anticoagulation use. On Psych assessment:    Patient presents via [facility] for new onset of psychosis. Patient reporting he believes hillbillies are trying to shoot him with arrows.  Patient reports there is a woman who walks through walls and showing

## 2023-08-13 NOTE — ED NOTES
Patient transported to [address] [place] [address] On the way out of [facility] patient reported seeing \"something\" coming out of the mattress. Discharged without incident.       Rena Wu RN  08/12/23 2021

## 2024-01-12 ENCOUNTER — HOSPITAL ENCOUNTER (EMERGENCY)
Age: 39
Discharge: HOME | End: 2024-01-12
Payer: OTHER GOVERNMENT

## 2024-01-12 ENCOUNTER — APPOINTMENT (OUTPATIENT)
Dept: GENERAL RADIOLOGY | Age: 39
End: 2024-01-12
Payer: OTHER GOVERNMENT

## 2024-01-12 VITALS
RESPIRATION RATE: 18 BRPM | HEART RATE: 85 BPM | DIASTOLIC BLOOD PRESSURE: 93 MMHG | TEMPERATURE: 97.9 F | OXYGEN SATURATION: 99 % | SYSTOLIC BLOOD PRESSURE: 144 MMHG | HEIGHT: 72 IN | BODY MASS INDEX: 26.41 KG/M2 | WEIGHT: 195 LBS

## 2024-01-12 DIAGNOSIS — S22.41XA CLOSED FRACTURE OF MULTIPLE RIBS OF RIGHT SIDE, INITIAL ENCOUNTER: ICD-10-CM

## 2024-01-12 DIAGNOSIS — S46.911A STRAIN OF RIGHT SHOULDER, INITIAL ENCOUNTER: Primary | ICD-10-CM

## 2024-01-12 PROCEDURE — 73030 X-RAY EXAM OF SHOULDER: CPT

## 2024-01-12 PROCEDURE — 6370000000 HC RX 637 (ALT 250 FOR IP): Performed by: PHYSICIAN ASSISTANT

## 2024-01-12 PROCEDURE — 6360000002 HC RX W HCPCS: Performed by: PHYSICIAN ASSISTANT

## 2024-01-12 PROCEDURE — 99284 EMERGENCY DEPT VISIT MOD MDM: CPT

## 2024-01-12 PROCEDURE — 96372 THER/PROPH/DIAG INJ SC/IM: CPT

## 2024-01-12 PROCEDURE — 71101 X-RAY EXAM UNILAT RIBS/CHEST: CPT

## 2024-01-12 RX ORDER — KETOROLAC TROMETHAMINE 30 MG/ML
60 INJECTION, SOLUTION INTRAMUSCULAR; INTRAVENOUS ONCE
Status: COMPLETED | OUTPATIENT
Start: 2024-01-12 | End: 2024-01-12

## 2024-01-12 RX ORDER — OXYCODONE HYDROCHLORIDE AND ACETAMINOPHEN 5; 325 MG/1; MG/1
1 TABLET ORAL ONCE
Status: COMPLETED | OUTPATIENT
Start: 2024-01-12 | End: 2024-01-12

## 2024-01-12 RX ORDER — ETODOLAC 400 MG/1
400 TABLET, FILM COATED ORAL 2 TIMES DAILY
Qty: 14 TABLET | Refills: 0 | Status: SHIPPED | OUTPATIENT
Start: 2024-01-12

## 2024-01-12 RX ORDER — ORPHENADRINE CITRATE 30 MG/ML
60 INJECTION INTRAMUSCULAR; INTRAVENOUS ONCE
Status: COMPLETED | OUTPATIENT
Start: 2024-01-12 | End: 2024-01-12

## 2024-01-12 RX ORDER — OXYCODONE HYDROCHLORIDE AND ACETAMINOPHEN 5; 325 MG/1; MG/1
1 TABLET ORAL EVERY 6 HOURS PRN
Qty: 12 TABLET | Refills: 0 | Status: SHIPPED | OUTPATIENT
Start: 2024-01-12 | End: 2024-01-15

## 2024-01-12 RX ORDER — TIZANIDINE 4 MG/1
4 TABLET ORAL EVERY 8 HOURS PRN
Qty: 15 TABLET | Refills: 0 | Status: SHIPPED | OUTPATIENT
Start: 2024-01-12

## 2024-01-12 RX ADMIN — OXYCODONE AND ACETAMINOPHEN 1 TABLET: 5; 325 TABLET ORAL at 23:35

## 2024-01-12 RX ADMIN — ORPHENADRINE CITRATE 60 MG: 60 INJECTION INTRAMUSCULAR; INTRAVENOUS at 22:31

## 2024-01-12 RX ADMIN — KETOROLAC TROMETHAMINE 60 MG: 30 INJECTION, SOLUTION INTRAMUSCULAR at 22:32

## 2024-01-12 ASSESSMENT — PAIN - FUNCTIONAL ASSESSMENT: PAIN_FUNCTIONAL_ASSESSMENT: 0-10

## 2024-01-12 ASSESSMENT — LIFESTYLE VARIABLES
HOW MANY STANDARD DRINKS CONTAINING ALCOHOL DO YOU HAVE ON A TYPICAL DAY: PATIENT DOES NOT DRINK
HOW OFTEN DO YOU HAVE A DRINK CONTAINING ALCOHOL: NEVER

## 2024-01-12 ASSESSMENT — PAIN SCALES - GENERAL
PAINLEVEL_OUTOF10: 8
PAINLEVEL_OUTOF10: 8
PAINLEVEL_OUTOF10: 6
PAINLEVEL_OUTOF10: 8

## 2024-01-12 ASSESSMENT — ENCOUNTER SYMPTOMS: COUGH: 0

## 2024-01-12 ASSESSMENT — PAIN DESCRIPTION - DESCRIPTORS: DESCRIPTORS: ACHING

## 2024-01-12 ASSESSMENT — PAIN DESCRIPTION - PAIN TYPE: TYPE: ACUTE PAIN

## 2024-01-12 ASSESSMENT — PAIN DESCRIPTION - ORIENTATION: ORIENTATION: RIGHT

## 2024-01-12 ASSESSMENT — PAIN DESCRIPTION - LOCATION: LOCATION: SHOULDER

## 2024-01-13 NOTE — ED TRIAGE NOTES
Patient arrived via private car and a friend due to R shoulder pain that started a little yesterday but woke this morning in more pain. He offers c/o increased pain with any movement and goes into his R upper chest and into his neck. No OTC meds today for the pain.

## 2024-01-13 NOTE — ED PROVIDER NOTES
findings seen about the right shoulder.  Mild right AC   joint arthrosis.             LABS:  Labs Reviewed - No data to display    All other labs were within normal range or not returnedas of this dictation.    EMERGENCYDEPARTMENT COURSE and DIFFERENTIAL DIAGNOSIS/MDM:   Vitals:    Vitals:    01/12/24 2215   BP: (!) 144/93   Pulse: 85   Resp: 16   Temp: 97.9 °F (36.6 °C)   TempSrc: Oral   SpO2: 99%   Weight: 88.5 kg (195 lb)   Height: 1.829 m (6')       REASSESSMENT        Patient presented with right shoulder pain radiating into the right ribs.  X-ray of the right shoulder was unremarkable however ribs does show some minimally displaced fractures of ninth and 10th rib.  She was placed in an arm sling and treated supportively and referred to orthopedic for follow    Medical Decision Making  Amount and/or Complexity of Data Reviewed  Radiology: ordered.    Risk  Prescription drug management.       Coding     PROCEDURES:    Procedures      FINAL IMPRESSION      1. Strain of right shoulder, initial encounter          DISPOSITION/PLAN   DISPOSITION Decision To Discharge 01/12/2024 11:25:47 PM      PATIENT REFERRED TO:  Deni Carty MD  [address] [place] [address] [phone]    Call in 3 days        DISCHARGE MEDICATIONS:  New Prescriptions    ETODOLAC (LODINE) 400 MG TABLET    Take 1 tablet by mouth 2 times daily    OXYCODONE-ACETAMINOPHEN (PERCOCET) 5-325 MG PER TABLET    Take 1 tablet by mouth every 6 hours as needed for Pain for up to 3 days. Intended supply: 3 days. Take lowest dose possible to manage pain Max Daily Amount: 4 tablets    TIZANIDINE (ZANAFLEX) 4 MG TABLET    Take 1 tablet by mouth every 8 hours as needed (pain/spasm)       (Please note that portions of this note were completed with a voice recognition program.  Efforts were made to edit the dictations but occasionally words are mis-transcribed.)    Ty Asencio PA-C    Supervising Physician Ty Ray,

## 2024-02-06 ENCOUNTER — CLINICAL SUPPORT (OUTPATIENT)
Dept: ORTHOPEDIC SURGERY | Facility: CLINIC | Age: 39
End: 2024-02-06
Payer: MEDICAID

## 2024-02-06 ENCOUNTER — OFFICE VISIT (OUTPATIENT)
Dept: ORTHOPEDIC SURGERY | Facility: CLINIC | Age: 39
End: 2024-02-06
Payer: MEDICAID

## 2024-02-06 DIAGNOSIS — M54.10 BACK PAIN WITH RADICULOPATHY: Primary | ICD-10-CM

## 2024-02-06 DIAGNOSIS — M54.10 BACK PAIN WITH RADICULOPATHY: ICD-10-CM

## 2024-02-06 PROCEDURE — 72100 X-RAY EXAM L-S SPINE 2/3 VWS: CPT | Performed by: ORTHOPAEDIC SURGERY

## 2024-02-06 PROCEDURE — 99205 OFFICE O/P NEW HI 60 MIN: CPT | Performed by: PHYSICIAN ASSISTANT

## 2024-02-06 PROCEDURE — 1036F TOBACCO NON-USER: CPT | Performed by: PHYSICIAN ASSISTANT

## 2024-02-06 RX ORDER — CELECOXIB 200 MG/1
200 CAPSULE ORAL DAILY
Qty: 30 CAPSULE | Refills: 0 | Status: SHIPPED | OUTPATIENT
Start: 2024-02-06 | End: 2024-03-07

## 2024-02-06 ASSESSMENT — PAIN - FUNCTIONAL ASSESSMENT: PAIN_FUNCTIONAL_ASSESSMENT: 0-10

## 2024-02-06 ASSESSMENT — PAIN SCALES - GENERAL: PAINLEVEL_OUTOF10: 7

## 2024-02-06 NOTE — PROGRESS NOTES
New patient to us new to the practice comes the office complaining of low back pain radiating down the left leg numbness and tingling.  Down the leg.  He does have a history of peripheral neuropathy due to alcohol abuse in the past.  And has been treated at the [facility].  He has had epidural injections he said dry needling he has had other injections.  Last injection was about a week ago they did a couple extra injections.  He had also injections back in June.  He had physical therapy back in June and continued with that afterwards.  He denies bowel or bladder complaints saddle anesthesia gait or balance changes.  Denies any spine surgeries in the past.  Denies any trauma accidents or falls to exacerbate or cause his pain.  I do not have any scans or results from the [facility].    Physical exam: Well-nourished, well kept.  No lymphangitis or lymphadenopathy in the examined extremities.  Good perfusion to the extremities ×4.  Radial and dorsalis pedis pulses 2+.  Capillary refill to all 4 digits brisk.  No distal edema x 4.  Gait normal.  Can walk on heels and toes.  Examination of the neck reveals no swelling, step-off, or point tenderness.  Range of motion with flexion, extension, side bending and rotation is well maintained without crepitance, instability, or exacerbation of pain.  Strength is within normal limits.  There is decreased range of motion flexion extension rotation lumbar spine tender lumbar spinal musculature good strength no instability.  Examination of the upper extremities reveals no point tenderness, swelling, or deformity.  Range of motion of the shoulders, elbows, wrists, and fingers are all full without crepitance, instability, or exacerbation of pain.  Strength is 5/5 throughout.  Examination of the lower extremities reveals no point tenderness, swelling, or deformity.  Range of motion of the hips, knees, and ankles are full without crepitance, instability, or exacerbation of pain.   Strength is 5/5 throughout.  No redness, abrasions, or lesions on all 4 extremities, head and neck, or trunk.  Gross sensation intact in the extremities ×4.  Deep tendon reflexes 2+ and symmetric bilaterally.  Antonietta, clonus, and Babinski were negative.  Straight leg raise negative.  Affect normal.  Alert and oriented ×3.  Coordination normal.    X-ray: X-ray lumbar spine AP lateral taken today shows a degenerative disc disease most severe at the L5-S1 level with foraminal narrowing noted.  No fractures dislocations or bony lytic lesions.  I looked at the image on his phone of the MRI sagittal view T2 image.  Showing a degenerative disc at L5-S1.  With a bulging herniated disc noted.  I do not have any axial images to actually see the severity of that.    Assessment: This a patient with low back pain radicular pain sensory changes that needs further workup.  His symptoms have worsened over the past 6 months.  He had physical therapy he had chiropractic he had dry needling he had over-the-counter meds he had prescription meds.  He got handouts from the therapist that he has been continuing to do since he finished physical therapy it is affecting his daily life and he wants this fixed surgically.  He has tried every conservative treatment and he just wants to get back to living a normal life.  He understands surgery elected but he understands he does not have to have surgery.  I discussed the surgical options for him which would be the least amount of surgery would be may be a discectomy.  I believe he would probably benefit more from a interbody fusion with instrumentation using a retracting tube.  We discussed all the risk benefits the options with him.    Plan: We will get a new MRI lumbar spine at [facility] for diagnostic purposes for surgical intervention.

## 2024-02-07 ENCOUNTER — OFFICE VISIT (OUTPATIENT)
Dept: UROLOGY | Facility: CLINIC | Age: 39
End: 2024-02-07
Payer: MEDICAID

## 2024-02-07 VITALS
HEART RATE: 87 BPM | WEIGHT: 212 LBS | BODY MASS INDEX: 28.71 KG/M2 | DIASTOLIC BLOOD PRESSURE: 82 MMHG | HEIGHT: 72 IN | SYSTOLIC BLOOD PRESSURE: 142 MMHG

## 2024-02-07 DIAGNOSIS — R68.82 LOW LIBIDO: ICD-10-CM

## 2024-02-07 DIAGNOSIS — N50.812 PAIN IN LEFT TESTICLE: Primary | ICD-10-CM

## 2024-02-07 PROCEDURE — 99205 OFFICE O/P NEW HI 60 MIN: CPT | Performed by: NURSE PRACTITIONER

## 2024-02-07 PROCEDURE — 1036F TOBACCO NON-USER: CPT | Performed by: NURSE PRACTITIONER

## 2024-02-07 RX ORDER — TIZANIDINE 4 MG/1
4 TABLET ORAL
COMMUNITY
Start: 2024-01-13

## 2024-02-07 RX ORDER — HYDROCHLOROTHIAZIDE 12.5 MG/1
12.5 TABLET ORAL
COMMUNITY
Start: 2023-09-15

## 2024-02-07 RX ORDER — ETODOLAC 400 MG/1
400 TABLET, FILM COATED ORAL 2 TIMES DAILY
COMMUNITY
Start: 2024-01-13

## 2024-02-07 RX ORDER — HYDROCODONE BITARTRATE AND ACETAMINOPHEN 5; 325 MG/1; MG/1
1 TABLET ORAL EVERY 6 HOURS PRN
Qty: 20 TABLET | Refills: 0 | Status: SHIPPED | OUTPATIENT
Start: 2024-02-07 | End: 2024-02-14 | Stop reason: SDUPTHER

## 2024-02-07 NOTE — PROGRESS NOTES
"UROLOGIC INITIAL EVALUATION     PROBLEM LIST:  1. Pain in left testicle  DISCONTINUED: HYDROcodone-acetaminophen (Norco) 5-325 mg tablet      2. Low libido  Testosterone           HISTORY OF PRESENT ILLNESS:   Josh Schaeffer is a 38 y.o. 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    PAST SURGICAL HISTORY:  No past surgical history on file.     ALLERGIES:   No Known Allergies     MEDICATIONS:   Current Outpatient Medications on File Prior to Visit   Medication Sig Dispense Refill    celecoxib (CeleBREX) 200 mg capsule Take 1 capsule (200 mg) by mouth once daily. 30 capsule 0    etodolac (Lodine) 400 mg tablet Take 1 tablet (400 mg) by mouth 2 times a day.      hydroCHLOROthiazide (HYDRODiuril) 12.5 mg tablet 1 tablet (12.5 mg).      tiZANidine (Zanaflex) 4 mg tablet 1 tablet (4 mg).       No current facility-administered medications on file prior to visit.        SOCIAL HISTORY:  Patient  reports that he has never smoked. He has never used smokeless tobacco.   Social History     Socioeconomic History    Marital status:      Spouse name: Not on file    Number of children: Not on file    Years of education: Not on file    Highest education level: Not on file   Occupational History    Not on file   Tobacco Use    Smoking status: Never    Smokeless tobacco: Never   Substance and Sexual Activity    Alcohol use: Not on file    Drug use: Not on file    Sexual activity: Not on file   Other " "Topics Concern    Not on file   Social History Narrative    Not on file     Social Determinants of Health     Financial Resource Strain: Not on file   Food Insecurity: Not on file   Transportation Needs: Not on file   Physical Activity: Not on file   Stress: Not on file   Social Connections: Not on file   Intimate Partner Violence: Not on file   Housing Stability: Not on file       FAMILY HISTORY:  No family history on file.    REVIEW OF SYSTEMS:  Review of Systems   HENT:  Positive for hearing loss and tinnitus.    Genitourinary:  Positive for testicular pain.   Psychiatric/Behavioral:  Positive for dysphoric mood. Negative for suicidal ideas.        PHYSICAL EXAM:  Visit Vitals  /82   Pulse 87     Constitutional: Well-developed and well-nourished. No distress.    Head: Normocephalic and atraumatic.    Neck: Normal range of motion.     Pulmonary/Chest: Effort normal. No respiratory distress.   Abdominal: Non-distended.  : See below.  Integumentary: No rash or lesions visualized.  Musculoskeletal: Normal range of motion.    Neurological: Alert and oriented.  Psychiatric: Dysphoric mood, blunted affect.     LABORATORY REVIEW:   No results found for: \"GLU\", \"BUN\", \"CREATININE\", \"EGFR\", \"NA\", \"K\", \"CL\", \"CO2\", \"OSMOLALITY\", \"CALCIUM\"   No results found for: \"WBC\", \"RBC\", \"HGB\", \"HCT\", \"MCV\", \"MCH\", \"MCHC\", \"RDW\", \"PLT\", \"MPV\"        Assessment:      1. Pain in left testicle  DISCONTINUED: HYDROcodone-acetaminophen (Norco) 5-325 mg tablet      2. Low libido  Testosterone          Josh Schaeffer is a 38 y.o. with complex hx of chronic testicular pain as above and decreased libido. Goals include alleviating pain, understanding cause. On exam, R testicle is mobile without masses. Left testicle is indistinct, ?enlarged and tender to palpation. Suspect component of referred MSK pain given nature of injury associated with onset.     Plan:   Scheduled for lumbar MRI, will request to add pelvis  Check testosterone " level  Norco 5-325 mg po q 6 hrs prn pain, may contribute to decrease in libido/sexual function; PDMP reviewed  RTC pending above results; discussed option for referral to men's sexual health specialist  Encouraged to contact us in the interim with any questions, concerns

## 2024-02-12 ENCOUNTER — TELEPHONE (OUTPATIENT)
Dept: UROLOGY | Facility: CLINIC | Age: 39
End: 2024-02-12
Payer: MEDICAID

## 2024-02-12 DIAGNOSIS — N50.812 PAIN IN LEFT TESTICLE: ICD-10-CM

## 2024-02-12 NOTE — TELEPHONE ENCOUNTER
PT CALLING IN FOR A MEDICATION REFILL ON THE HYDROcodone-acetaminophen.   SEND TO [facility] IN [place]. THANK YOU!

## 2024-02-13 ENCOUNTER — LAB (OUTPATIENT)
Dept: LAB | Facility: LAB | Age: 39
End: 2024-02-13
Payer: MEDICAID

## 2024-02-13 DIAGNOSIS — R68.82 LOW LIBIDO: ICD-10-CM

## 2024-02-13 LAB — TESTOST SERPL-MCNC: 511 NG/DL (ref 240–1000)

## 2024-02-13 PROCEDURE — 36415 COLL VENOUS BLD VENIPUNCTURE: CPT

## 2024-02-13 PROCEDURE — 84403 ASSAY OF TOTAL TESTOSTERONE: CPT

## 2024-02-14 RX ORDER — HYDROCODONE BITARTRATE AND ACETAMINOPHEN 5; 325 MG/1; MG/1
1 TABLET ORAL EVERY 6 HOURS PRN
Qty: 20 TABLET | Refills: 0 | Status: SHIPPED | OUTPATIENT
Start: 2024-02-14 | End: 2024-02-21

## 2024-02-20 ENCOUNTER — HOSPITAL ENCOUNTER (OUTPATIENT)
Dept: RADIOLOGY | Facility: HOSPITAL | Age: 39
Discharge: HOME | End: 2024-02-20
Payer: MEDICAID

## 2024-02-20 DIAGNOSIS — M54.10 BACK PAIN WITH RADICULOPATHY: ICD-10-CM

## 2024-02-22 ENCOUNTER — PATIENT MESSAGE (OUTPATIENT)
Dept: UROLOGY | Facility: CLINIC | Age: 39
End: 2024-02-22
Payer: MEDICAID

## 2024-02-22 DIAGNOSIS — N50.812 PAIN IN LEFT TESTICLE: Primary | ICD-10-CM

## 2024-02-23 RX ORDER — HYDROCODONE BITARTRATE AND ACETAMINOPHEN 5; 325 MG/1; MG/1
1 TABLET ORAL EVERY 6 HOURS PRN
Qty: 18 TABLET | Refills: 0 | Status: SHIPPED | OUTPATIENT
Start: 2024-02-23 | End: 2024-03-01

## 2024-02-26 ENCOUNTER — HOSPITAL ENCOUNTER (OUTPATIENT)
Dept: RADIOLOGY | Facility: CLINIC | Age: 39
Discharge: HOME | End: 2024-02-26
Payer: MEDICAID

## 2024-02-26 DIAGNOSIS — N50.812 PAIN IN LEFT TESTICLE: ICD-10-CM

## 2024-02-26 PROCEDURE — 76870 US EXAM SCROTUM: CPT | Performed by: RADIOLOGY

## 2024-02-26 PROCEDURE — 76870 US EXAM SCROTUM: CPT

## 2024-02-26 NOTE — RESULT ENCOUNTER NOTE
This gentleman has had severe, chronic testicular pain despite multiple interventions. He will be getting an MRI 3/6 to look for possible MSK source of pain. Based on US, do you think you might be able to offer him a surgical intervention?

## 2024-03-03 ASSESSMENT — ENCOUNTER SYMPTOMS: DYSPHORIC MOOD: 1

## 2024-03-06 ENCOUNTER — HOSPITAL ENCOUNTER (OUTPATIENT)
Dept: RADIOLOGY | Facility: HOSPITAL | Age: 39
End: 2024-03-06
Payer: MEDICAID

## 2024-03-12 ENCOUNTER — TELEPHONE (OUTPATIENT)
Dept: ORTHOPEDIC SURGERY | Facility: CLINIC | Age: 39
End: 2024-03-12
Payer: MEDICAID

## 2024-03-12 NOTE — TELEPHONE ENCOUNTER
Pt called and said that he was unable to his MRI for Sarath , d/t monitor that was not compatible with the MRI. He said he is getting a urological MRI for the entire body, and asked if that would be good enough so he wouldn't have to have the spine MRI, I asked him how he was going to do it with the monitor , he said he is getting it removed , I told him to call scheduling and see if he can get all done that day.

## 2024-03-17 ENCOUNTER — APPOINTMENT (OUTPATIENT)
Dept: RADIOLOGY | Facility: HOSPITAL | Age: 39
End: 2024-03-17
Payer: MEDICAID

## 2024-03-18 ENCOUNTER — HOSPITAL ENCOUNTER (OUTPATIENT)
Dept: RADIOLOGY | Facility: CLINIC | Age: 39
Discharge: HOME | End: 2024-03-18
Payer: MEDICAID

## 2024-03-18 DIAGNOSIS — N50.812 PAIN IN LEFT TESTICLE: ICD-10-CM

## 2024-03-18 PROCEDURE — 72197 MRI PELVIS W/O & W/DYE: CPT | Performed by: STUDENT IN AN ORGANIZED HEALTH CARE EDUCATION/TRAINING PROGRAM

## 2024-03-18 PROCEDURE — 72148 MRI LUMBAR SPINE W/O DYE: CPT | Performed by: RADIOLOGY

## 2024-03-18 PROCEDURE — A9575 INJ GADOTERATE MEGLUMI 0.1ML: HCPCS | Performed by: NURSE PRACTITIONER

## 2024-03-18 PROCEDURE — 2550000001 HC RX 255 CONTRASTS: Performed by: NURSE PRACTITIONER

## 2024-03-18 PROCEDURE — 72197 MRI PELVIS W/O & W/DYE: CPT

## 2024-03-18 PROCEDURE — 72148 MRI LUMBAR SPINE W/O DYE: CPT

## 2024-03-18 RX ORDER — GADOTERATE MEGLUMINE 376.9 MG/ML
18 INJECTION INTRAVENOUS
Status: COMPLETED | OUTPATIENT
Start: 2024-03-18 | End: 2024-03-18

## 2024-03-18 RX ADMIN — GADOTERATE MEGLUMINE 18 ML: 376.9 INJECTION INTRAVENOUS at 17:18

## 2024-03-19 ENCOUNTER — APPOINTMENT (OUTPATIENT)
Dept: UROLOGY | Facility: CLINIC | Age: 39
End: 2024-03-19
Payer: MEDICAID

## 2024-03-26 ENCOUNTER — OFFICE VISIT (OUTPATIENT)
Dept: ORTHOPEDIC SURGERY | Facility: CLINIC | Age: 39
End: 2024-03-26
Payer: MEDICAID

## 2024-03-26 DIAGNOSIS — M54.10 BACK PAIN WITH RADICULOPATHY: Primary | ICD-10-CM

## 2024-03-26 PROCEDURE — 1036F TOBACCO NON-USER: CPT | Performed by: PHYSICIAN ASSISTANT

## 2024-03-26 PROCEDURE — 99215 OFFICE O/P EST HI 40 MIN: CPT | Performed by: PHYSICIAN ASSISTANT

## 2024-03-26 NOTE — PROGRESS NOTES
Established patient follow-up MRI for low back pain left leg pain.  This is a patient that had all conservative treatment physical [facility] over-the-[facility] meds and prescription meds that are not helping.  He has pain in the back goes down the left leg all the way to the foot.  There is also sensory changes.    Physical exam: Well-nourished, well kept.  No lymphangitis or lymphadenopathy in the examined extremities.  Good perfusion to the lower extremities bilaterally.  Dorsalis pedis pulses 2+.  Capillary refill to the digits brisk.  No distal edema.  Gait normal.  Can walk on heels and toes.  Decreased range of motion flexion extension rotation lumbar spine tender lumbar spinal musculature good strength no instability.  Examination of the lower extremities reveals no point tenderness, swelling, or deformity.  Range of motion of the hips, knees, and ankles are full without crepitance, instability, or exacerbation of pain.  Strength is 5/5 throughout.  No redness, abrasions, or lesions on the lower extremities bilaterally.  Gross sensation intact to the lower extremity is bilaterally.  Affect normal.    MRI: MRI was reviewed report was reviewed as well showing a degenerative disc at the L5-S1 level.  Some small annular tear and a central left-sided disc herniation.  Slightly abutting that left S1 nerve root.    Assessment: This a patient with persistent low back pain radicular pain is tried all conservative treatment.  Including injections therapy meds etc. and he says he wants something done to this is affecting his life we talked him about his options which we continue conservative care PT chiropractic epidurals or surgery.  He wants to have surgery.  He enters understands surgery is elective he understands he may not get better after surgery he understands he might get worse after surgery.  I talked to him about surgery I think the best surgical procedure for him would be a  microdiscectomy using a retracting tube coming out from the left side.  We discussed all those risk benefits the options.  All of the risks, benefits, and potential complications for operative and nonoperative treatment were discussed at length with the patient.  The patient understands that surgery is elective.  The patient understands that I do not have to do surgery.  The patient understands that surgery comes with more risks than not doing surgery.  Risks of operative intervention include, but are not limited to: Anesthesia complications, excessive blood loss, infection, damaged to uninjured structures including, but not limited to, the dural sac and nerve roots, blood clots, and lack of improvement or worsening of symptoms.  These complications could result in death, permanent disability, or need for reoperation.  The patient completely understood those risks and wished to proceed with operative intervention.    Plan: Will start surgical planning.  And I will get him to see Dr. Merritt for final surgical plan.  Patient is not on any type of blood thinning medications.  And no medical history per se

## 2024-04-03 ENCOUNTER — OFFICE VISIT (OUTPATIENT)
Dept: FAMILY MEDICINE CLINIC | Age: 39
End: 2024-04-03
Payer: COMMERCIAL

## 2024-04-03 ENCOUNTER — TELEPHONE (OUTPATIENT)
Dept: FAMILY MEDICINE CLINIC | Age: 39
End: 2024-04-03

## 2024-04-03 VITALS
HEIGHT: 72 IN | BODY MASS INDEX: 28.99 KG/M2 | SYSTOLIC BLOOD PRESSURE: 128 MMHG | DIASTOLIC BLOOD PRESSURE: 64 MMHG | TEMPERATURE: 98 F | OXYGEN SATURATION: 97 % | WEIGHT: 214 LBS | HEART RATE: 81 BPM

## 2024-04-03 DIAGNOSIS — N50.812 PAIN IN LEFT TESTICLE: ICD-10-CM

## 2024-04-03 DIAGNOSIS — F41.9 ANXIETY AND DEPRESSION: ICD-10-CM

## 2024-04-03 DIAGNOSIS — F32.A ANXIETY AND DEPRESSION: ICD-10-CM

## 2024-04-03 DIAGNOSIS — N50.812 PAIN IN LEFT TESTICLE: Primary | ICD-10-CM

## 2024-04-03 DIAGNOSIS — F43.10 PTSD (POST-TRAUMATIC STRESS DISORDER): ICD-10-CM

## 2024-04-03 DIAGNOSIS — M54.50 LUMBAR SPINE PAIN: Primary | ICD-10-CM

## 2024-04-03 DIAGNOSIS — I10 PRIMARY HYPERTENSION: ICD-10-CM

## 2024-04-03 PROCEDURE — 3078F DIAST BP <80 MM HG: CPT | Performed by: NURSE PRACTITIONER

## 2024-04-03 PROCEDURE — 3074F SYST BP LT 130 MM HG: CPT | Performed by: NURSE PRACTITIONER

## 2024-04-03 PROCEDURE — 99204 OFFICE O/P NEW MOD 45 MIN: CPT | Performed by: NURSE PRACTITIONER

## 2024-04-03 SDOH — ECONOMIC STABILITY: FOOD INSECURITY: WITHIN THE PAST 12 MONTHS, YOU WORRIED THAT YOUR FOOD WOULD RUN OUT BEFORE YOU GOT MONEY TO BUY MORE.: SOMETIMES TRUE

## 2024-04-03 SDOH — ECONOMIC STABILITY: FOOD INSECURITY: WITHIN THE PAST 12 MONTHS, THE FOOD YOU BOUGHT JUST DIDN'T LAST AND YOU DIDN'T HAVE MONEY TO GET MORE.: SOMETIMES TRUE

## 2024-04-03 SDOH — ECONOMIC STABILITY: INCOME INSECURITY: HOW HARD IS IT FOR YOU TO PAY FOR THE VERY BASICS LIKE FOOD, HOUSING, MEDICAL CARE, AND HEATING?: SOMEWHAT HARD

## 2024-04-03 SDOH — ECONOMIC STABILITY: HOUSING INSECURITY
IN THE LAST 12 MONTHS, WAS THERE A TIME WHEN YOU DID NOT HAVE A STEADY PLACE TO SLEEP OR SLEPT IN A SHELTER (INCLUDING NOW)?: YES

## 2024-04-03 ASSESSMENT — PATIENT HEALTH QUESTIONNAIRE - PHQ9
SUM OF ALL RESPONSES TO PHQ9 QUESTIONS 1 & 2: 0
1. LITTLE INTEREST OR PLEASURE IN DOING THINGS: NOT AT ALL
SUM OF ALL RESPONSES TO PHQ QUESTIONS 1-9: 0
SUM OF ALL RESPONSES TO PHQ QUESTIONS 1-9: 0
2. FEELING DOWN, DEPRESSED OR HOPELESS: NOT AT ALL
SUM OF ALL RESPONSES TO PHQ QUESTIONS 1-9: 0
SUM OF ALL RESPONSES TO PHQ QUESTIONS 1-9: 0

## 2024-04-03 ASSESSMENT — ENCOUNTER SYMPTOMS
CHEST TIGHTNESS: 0
SHORTNESS OF BREATH: 0

## 2024-04-03 NOTE — PATIENT INSTRUCTIONS
[employer]*  (Call 211 if need more resources.)      Medical:      [facility]   What they offer: [employer] discounts fees for qualifying insured and uninsured persons.  We can assist you in signing up for Medicaid, Medicare, and Marketplace Exchange insurance plans.  They offer 4 locations in [place], 2 locations in [place] and 1 in [facility].    Phone Number: [phone]  Website: [email or web address].[email or web address]    [facility]:  What they offer: We provide health care services to the uninsured and underinsured. From providing quality care to connecting patients to critical community resources, our patients and their needs are our highest priority.  Phone number: 274.790.4484  Website: https://[employer]Security      Utility:         211:  What they offer: Help find lower cost options for phone or internet as well as help paying utility bills.   Phone Number: 211  Website: https://wwwCauses/get-help/utilities-expenses     [facility]  What they offer:  Assistance with utilities  Phone:  311.444.3982  Website: https://www.[employer].org/usn/                       [facility] [employer]   What they Offer: Assistance with utilities  Phone: 247.993.6387  Website: https://www.[employer] Filter/    [facility] [place]  What they Offer: Assistance with utilities  Phone: 908.841.6429  Website: https://[employer].org/      MEDICATIONS:   [facility]  What they offer: [employer] tracks prescription drug prices and provides free drug coupons for discounts on medications.  Website: https://www.Shoes4you/    [employer]:  What they offer: [employer] offers free information on medications and healthcare cost savings programs including prescription assistance programs, coupons, and discount programs.  Helpline: 584.281.3799  Website: https://www.FOODITYeds.org    [employer]ist:  What they offer: Medication assistance  Website: https://www.rxassist.org/patient

## 2024-04-03 NOTE — PROGRESS NOTES
denies  valvular disease.   No h/o CHF.    No h/o  DVT or PE.   Denies chest pains  Denies palpitations.    GI:    No h/o liver disease, no h/o hepatitis, no h/o cirrhosis.    Denies  recent upper or lower GI bleeding.     / RENAL:   No h/o chronic kidney disease..    ENDOCRINE:   Denies  diabetes  Denies  thyroid problems     HEMATOLOGY:  Patient denies  bleeding/bruising easily.  Denies gum bleeding. Denies frequent nosebleeds.  Denies family history of bleeding disorders.    ONCOLOGY:  Negative hx.      MUSCULOSKELETAL:    Normal.    PSYCHIATRIC:    Yes hx of anxiety, or depression.   Denies suicidal ideation at this moment.  Denies illicit drug use, denies ETOH abuse.    ANESTHESIA HISTORY:    Denies complications/ reactions to anesthesia.  (self or family)  Denies difficulty breathing post-op.  Denies difficult intubation.      ==================================================  Objective:  Vitals:    04/03/24 0743   BP: 128/64   Pulse: 81   Temp: 98 °F (36.7 °C)   TempSrc: Temporal   SpO2: 97%   Weight: 97.1 kg (214 lb)   Height: 1.829 m (6')       Physical Exam  Vitals and nursing note reviewed.   Constitutional:       General: He is not in acute distress.     Appearance: He is well-developed.   HENT:      Head: Normocephalic and atraumatic.   Cardiovascular:      Rate and Rhythm: Normal rate and regular rhythm.      Heart sounds: Normal heart sounds. No murmur heard.  Pulmonary:      Effort: Pulmonary effort is normal. No respiratory distress.      Breath sounds: Normal breath sounds. No wheezing.   Neurological:      Mental Status: He is alert and oriented to person, place, and time.         ==================================================  Assessment/Plan:    1. Lumbar spine pain  Will be having lumbar surgery at , based on preoperative testing will clear patient accordingly.     2. Primary hypertension  Continue hctz as prescribed    3. Pain in left testicle  Second opinion placed as he has been

## 2024-04-04 NOTE — TELEPHONE ENCOUNTER
Pt testing was done through  and scanned in, urology office was notified and will call pt to schedule

## 2024-04-08 NOTE — PROGRESS NOTES
HPI:  39 y.o. yo male patient complains of  scrotal pain, referred by [facility]. Procedure- (spermatic cord den[place] [employer] [place]), has reports worsening of issues since procedure.    #Scrotal pain   How long has this been going on- since 2018  which side/ or both- left, also reports atrophy  where is pain located- left during sex and heavy lifting  any noticeable swelling- no, but cyst compares to same size of left testicle  what was tried to relieve pain- No, has tried (hydrocodone-helped with sleep, sleeps with pillow between legs), wears compression shorts too  History of UTI/ STD exposure- No  History of vasectomy-  Partial, During denervation (left side only)  Cyst? Reports increase in size    -states that his pain is currently sharp/ pinching   -reports that his cord denervation on left initially relieved his pain, but pain returned   -cannot ejaculate / orgasm due to severity of pain  -hx of left hernia surgery  -patient is adamant about wanting to remove his left testicle at this time in hopes to relieve his pain  -reports that he will be having back surgery soon    MR Pelvis w +wo con 3/18/24: Personally reviewed and interpreted.    IMPRESSION:  -Left varicocele and septated epididymal head cyst versus  spermatocele, corresponding to prior ultrasound findings.  -Small fat containing right inguinal hernia.  -Unremarkable bilateral hip joints.    US SCROTUM 02/26/24: Personally reviewed and interpreted.    - Impression -  Epididymal cysts, greater on the left as described.  Small left varicoceles.    PMH:  No past medical history on file.     PSH:  No past surgical history on file.     Medications:    Current Outpatient Medications:     etodolac (Lodine) 400 mg tablet, Take 1 tablet (400 mg) by mouth 2 times a day., Disp: , Rfl:     hydroCHLOROthiazide (HYDRODiuril) 12.5 mg tablet, 1 tablet (12.5 mg)., Disp: , Rfl:     tiZANidine (Zanaflex) 4 mg tablet, 1 tablet (4 mg)., Disp: ,  Rfl:     Allergy:  No Known Allergies     Exam  Testicles descended bilaterally  Left epididymal cyst palpated, diffusely tender   Vasa palpable bilaterally  Varicocele: No  Penis circ'd, no lesions, no plaques      Assessment/Plan  #scrotal pain - epididymal cysts / varicoceles on left, s/p left cord denervation 10/2019   Discussed conservative measures like nsaids, elevation, ice, tight underwear etc.  Discussed that the next best option would be an orchiectomy given his history of unsuccessful cord denervation; Dicussed r/b/a of LEFT orchiectomy in detail   We also discussed that due to the wide range of pain and tenderness, I believe that this pain could be related to his back issues  Discussed proceeding with his upcoming back surgery to see if his pain is relieved, if no better after surgery, would then discuss orchiectomy   Referral to pain management       Scribe Attestation  By signing my name below, I, Kyle Tafoya, attest that this documentation  has been prepared under the direction and in the presence of Erika Zepeda MD.

## 2024-04-09 ENCOUNTER — OFFICE VISIT (OUTPATIENT)
Dept: UROLOGY | Facility: CLINIC | Age: 39
End: 2024-04-09
Payer: MEDICAID

## 2024-04-09 VITALS — TEMPERATURE: 98.2 F

## 2024-04-09 DIAGNOSIS — I86.1 VARICOCELE: ICD-10-CM

## 2024-04-09 DIAGNOSIS — N50.812 PAIN IN LEFT TESTICLE: Primary | ICD-10-CM

## 2024-04-09 PROCEDURE — 99214 OFFICE O/P EST MOD 30 MIN: CPT | Performed by: UROLOGY

## 2024-04-09 PROCEDURE — 1036F TOBACCO NON-USER: CPT | Performed by: UROLOGY

## 2024-04-10 ENCOUNTER — OFFICE VISIT (OUTPATIENT)
Dept: PAIN MEDICINE | Facility: CLINIC | Age: 39
End: 2024-04-10
Payer: MEDICAID

## 2024-04-10 VITALS
DIASTOLIC BLOOD PRESSURE: 75 MMHG | RESPIRATION RATE: 18 BRPM | HEIGHT: 72 IN | BODY MASS INDEX: 29.12 KG/M2 | HEART RATE: 75 BPM | SYSTOLIC BLOOD PRESSURE: 128 MMHG | OXYGEN SATURATION: 96 % | WEIGHT: 215 LBS | TEMPERATURE: 98.8 F

## 2024-04-10 DIAGNOSIS — M54.16 LUMBAR RADICULOPATHY: Primary | ICD-10-CM

## 2024-04-10 DIAGNOSIS — I86.1 VARICOCELE: ICD-10-CM

## 2024-04-10 DIAGNOSIS — Z79.899 MEDICATION MANAGEMENT: ICD-10-CM

## 2024-04-10 DIAGNOSIS — N50.812 PAIN IN LEFT TESTICLE: ICD-10-CM

## 2024-04-10 PROCEDURE — 99214 OFFICE O/P EST MOD 30 MIN: CPT | Performed by: ANESTHESIOLOGY

## 2024-04-10 PROCEDURE — 99204 OFFICE O/P NEW MOD 45 MIN: CPT | Performed by: ANESTHESIOLOGY

## 2024-04-10 PROCEDURE — 1036F TOBACCO NON-USER: CPT | Performed by: ANESTHESIOLOGY

## 2024-04-10 RX ORDER — TRAMADOL HYDROCHLORIDE 50 MG/1
50 TABLET ORAL EVERY 8 HOURS
Qty: 90 TABLET | Refills: 0 | Status: SHIPPED | OUTPATIENT
Start: 2024-04-10 | End: 2024-05-10

## 2024-04-10 RX ORDER — NALOXONE HYDROCHLORIDE 4 MG/.1ML
4 SPRAY NASAL AS NEEDED
Qty: 2 EACH | Refills: 0 | Status: SHIPPED | OUTPATIENT
Start: 2024-04-10

## 2024-04-10 ASSESSMENT — COLUMBIA-SUICIDE SEVERITY RATING SCALE - C-SSRS
1. IN THE PAST MONTH, HAVE YOU WISHED YOU WERE DEAD OR WISHED YOU COULD GO TO SLEEP AND NOT WAKE UP?: NO
6. HAVE YOU EVER DONE ANYTHING, STARTED TO DO ANYTHING, OR PREPARED TO DO ANYTHING TO END YOUR LIFE?: NO
2. HAVE YOU ACTUALLY HAD ANY THOUGHTS OF KILLING YOURSELF?: NO

## 2024-04-10 ASSESSMENT — ENCOUNTER SYMPTOMS
DEPRESSION: 0
LOSS OF SENSATION IN FEET: 0
OCCASIONAL FEELINGS OF UNSTEADINESS: 0

## 2024-04-10 ASSESSMENT — PAIN SCALES - GENERAL: PAINLEVEL: 6

## 2024-04-10 NOTE — H&P
History Of Present Illness  Josh Schaeffer is a 39 y.o. male presenting with left testicular pain.  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  2019 he had a spermatic cord resected and had residual back pain and testicular pain after that procedure.  He was then treated with multiple trigger point injections testicular blocks for a spermatocele it for about 4 [place] in April 2019 with a total of 5 different blocks.  He still had significant pain and was on hydrocodone 5 mg tablets 30 mg a day to moderate his pain and finally medically discharge.  Recent MRI which shows an L5-S1 disc bulge with left S1 nerve root impingement.  He was seen by a urologist at [facility] who indicated that he felt his pain was related to a bulging disc at the L5-S1 level causing left S1 nerve root impingement..     Past Medical History  Positive history of spermatocele left; bulging disc L5-S1 with S1 nerve root impingement on the left      Surgical History  Excision of left hematocele     Social History  Positive history of alcoholism patient has not drank alcohol now since October 2023    Family History  No family history on file.     Allergies  Patient has no known allergies.    Review of Systems  Remarkable for his chief complaint which is left testicular pain and low back pain  Physical Exam  Gen. appearance:  Patient's alert and oriented ×3 ;cooperative mild to moderate distress  Heart: Regular rate and rhythm  Lungs: Clear to auscultation  Abdomen: Soft nontender  Neuro: Cranial nerves II -XII intact; DTR: +2 over 4 biceps triceps brachial radialis patellar and Achilles  Spinal exam: Positive paraspinal tenderness noted bilaterally L4 5 L5-S1 with flexion extension and rotation/no bony abnormalities  Musculoskeletal:  Upper and lower extremity strength was 4/5 bilaterally  :  deferred  Skin: Warm and dry      Last Recorded Vitals  Blood  pressure 128/75, pulse 75, temperature 37.1 °C (98.8 °F), resp. rate 18, height 1.829 m (6'), weight 97.5 kg (215 lb), SpO2 96%.    Relevant Results         Assessment/Plan   Diagnoses and all orders for this visit:  Lumbar radiculopathy  -     traMADol (Ultram) 50 mg tablet; Take 1 tablet (50 mg) by mouth every 8 hours.  Pain in left testicle  -     Referral to Pain Medicine  Varicocele  -     Referral to Pain Medicine  Medication management  -     traMADol (Ultram) 50 mg tablet; Take 1 tablet (50 mg) by mouth every 8 hours.  -     naloxone (Narcan) 4 mg/0.1 mL nasal spray; Administer 1 spray (4 mg) into affected nostril(s) if needed for opioid reversal. May repeat every 2-3 minutes if needed, alternating nostrils, until medical assistance becomes available.    I advised the patient to go ahead with the scheduled a lumbar laminectomy with Dr. Merritt to see if his left testicular pain resolves.  He also discussed having a possible left orchiectomy in the event that that does not control his chronic testicular pain.  He was e-prescribed tramadol 50 mg tablets #90 or 1 every 8 hours with no refills.  He stated he would call following his lumbar laminectomy for further evaluation of his pain at that time.       I spent 52 minutes in the professional and overall care of this patient.      Ian Tong DO

## 2024-04-10 NOTE — PROGRESS NOTES
Pain #6/10 to his left testicle since 2019.  Referred here by his Urologist.  Pain is constant.  Has back pain and is meeting this Friday with Dr Merritt to discuss scheduling his back surgery.  Has had Norco in the past and made his pain tolerable.   They are no longer prescribing it. Is here for evaluation of his pain.

## 2024-04-12 ENCOUNTER — OFFICE VISIT (OUTPATIENT)
Dept: ORTHOPEDIC SURGERY | Facility: CLINIC | Age: 39
End: 2024-04-12
Payer: MEDICAID

## 2024-04-12 DIAGNOSIS — M54.16 LUMBAR RADICULOPATHY: Primary | ICD-10-CM

## 2024-04-12 DIAGNOSIS — M54.10 BACK PAIN WITH RADICULOPATHY: ICD-10-CM

## 2024-04-12 PROCEDURE — 99215 OFFICE O/P EST HI 40 MIN: CPT | Performed by: ORTHOPAEDIC SURGERY

## 2024-04-12 NOTE — PROGRESS NOTES
Chief complaint: Back pain, left leg pain and left testicular pain    HPI: Patient has a long history of back pain left leg pain and left testicular pain.  The back is the majority of his problem but the other problems do bother him.  He has been unsuccessful in alleviating this pain nonsurgically.  Is been going on for years.  The urologist tells him that his testicular pain is definitely coming from his S1 nerve root and needs to have surgery for that.  He has no fevers chills nausea vomiting.  There is no bowel or bladder changes.  There is no weakness.  He has been unable to work.  He was discharged from the  because of these chronic symptoms particularly the testicular pain.  He has had physical therapy and injections.  He is failed those.  He cannot live like this anymore.  Is completely affecting his life.    Physical exam: Well-nourished, well kept.  Good perfusion to the extremities ×4.  Radial and dorsalis pedis pulses 2+.  Capillary refill to all 4 digits brisk.  No distal edema x 4.  No lymphangitis or lymphadenopathy in the examined extremities.  Gait normal.  Can walk on heels and toes.  Examination of the neck reveals no swelling, step-off, or point tenderness.  Range of motion with flexion, extension, side bending and rotation is well maintained without crepitance, instability, or exacerbation of pain.  Strength is within normal limits.  Thoracic spine is nontender.  Examination of the back shows tenderness in the paraspinous musculature.  There is decreased range of motion in all directions due to guarding/muscle spasms and pain at extremes.  There is good strength and no instability.  Examination of the lower extremities reveals no point tenderness, swelling, or deformity.  Range of motion of the hips, knees, and ankles are full without crepitance, instability, or exacerbation of pain.  Strength is 5/5 throughout.  No redness, abrasions, or lesions on all 4 extremities, head and neck, or  trunk.  Gross sensation intact in the extremities ×4.  Deep tendon reflexes 2+ and symmetric bilaterally.  Antonietta, clonus, and Babinski were negative.  Straight leg raise negative.  Affect normal.  Alert and oriented ×3.  Coordination normal.    MRI was reviewed and x-rays were reviewed.  The patient has a herniated disc at L5-S1 on the left causing contact and displacement of the S1 nerve root.  There is no significant abnormalities on the x-rays other than some degenerative change is which are very mild.    Assessment/plan: Patient with back pain which is his main symptom but also some left leg numbness and left testicular pain which is debilitating for him.  He has been told that he needs to have the S1 nerve root decompressed by urology.  It is unclear to me whether or not a microdiscectomy would help him with his testicular pain but I have seen patients get testicular relief with decompression of nerve roots in the lumbar spine.  The patient does understand that if we were to proceed on with surgery that it would likely not give him relief of his back pain.  He understands that.  He understands the main goal this operation is for his left leg and perhaps may be helping his left testicular pain.    I had a long discussion with the patient and explained to them that their options are 1) live with the symptoms and see how they evolve, 2) physical therapy, 3) pain management or 4) surgery.  The patient wants to proceed with surgery.  This patient is severely inhibited with bodily function.    All of the risks, benefits, and potential complications for operative and nonoperative treatment were discussed at length with the patient.  The patient understands that surgery is elective.  The patient understands that I do not have to do surgery.  The patient understands that surgery comes with more risks than not doing surgery.  Risks of operative intervention include, but are not limited to: Anesthesia complications,  excessive blood loss, infection, damaged to uninjured structures including, but not limited to, the dural sac and nerve roots, blood clots, and lack of improvement or worsening of symptoms.  These complications could result in death, permanent disability, or need for reoperation.  The patient completely understood those risks and wished to proceed with operative intervention.    We are going to perform an L5-S1 left microdiscectomy with a globus retractor coming in from the left side.    The patient has been prescribed a lumbar LSO back brace.  The orthosis is required to reduce pain by restricting mobility of the trunk.  The patient is to wear the prescribed item as instructed for all activities of daily living unless otherwise specified in my notes.  The patient may also remove for normal hygiene and sleep unless the device is in place for a fracture or other injury requiring 24-hour stabilization.

## 2024-04-18 ENCOUNTER — TELEPHONE (OUTPATIENT)
Dept: FAMILY MEDICINE CLINIC | Age: 39
End: 2024-04-18

## 2024-04-18 NOTE — TELEPHONE ENCOUNTER
Called patient regarding insurance based on a VA Authorization report from [facility].  Patient's chart shows [employer] insurance, [employer], and [employer] insurance.  Only the [employer] is verifying for this patient.  The other two insurance coverages are rejecting.     Contacted patient and left message to call office back and provide update on insurance - if patient is covered by the  insurance and [employer], or just covered by the [employer].

## 2024-04-18 NOTE — TELEPHONE ENCOUNTER
Patient returned call to office.  Spoke with Mike, who confirmed he does not have [employer], and has not had it for quite some time, so he is unsure how that was even attached to his chart.      He indicated that the [employer] is something he has, but that the member ID he has, which is what appears on the card he has and we have in his chart, is probably not the number we need.  We tried that number, his SSN # and the plan number listed on the card, and none of it verifies in the system.  Patient asked to have this removed from his chart. He indicated that he could try to verify this number and call us to provide, but is not sure he'll be able to get through to obtain the proper number that needs to be used.     He confirmed that he does, in fact, have the [employer], and that's the only insurance he is using.  That insurance is verified in his chart.

## 2024-04-30 ENCOUNTER — OFFICE VISIT (OUTPATIENT)
Dept: FAMILY MEDICINE CLINIC | Age: 39
End: 2024-04-30
Payer: COMMERCIAL

## 2024-04-30 VITALS
WEIGHT: 216 LBS | OXYGEN SATURATION: 98 % | HEIGHT: 72 IN | SYSTOLIC BLOOD PRESSURE: 128 MMHG | DIASTOLIC BLOOD PRESSURE: 68 MMHG | HEART RATE: 68 BPM | BODY MASS INDEX: 29.26 KG/M2 | TEMPERATURE: 98 F

## 2024-04-30 DIAGNOSIS — Z01.818 PRE-OP EXAM: ICD-10-CM

## 2024-04-30 DIAGNOSIS — M54.50 LUMBAR SPINE PAIN: Primary | ICD-10-CM

## 2024-04-30 PROCEDURE — 3074F SYST BP LT 130 MM HG: CPT | Performed by: NURSE PRACTITIONER

## 2024-04-30 PROCEDURE — 99214 OFFICE O/P EST MOD 30 MIN: CPT | Performed by: NURSE PRACTITIONER

## 2024-04-30 PROCEDURE — 3078F DIAST BP <80 MM HG: CPT | Performed by: NURSE PRACTITIONER

## 2024-04-30 ASSESSMENT — ENCOUNTER SYMPTOMS
CHEST TIGHTNESS: 0
SHORTNESS OF BREATH: 0

## 2024-04-30 NOTE — PROGRESS NOTES
disease.   No h/o CHF.    No h/o  DVT or PE.   Denies chest pains  Denies palpitations.    GI:    No h/o liver disease, no h/o hepatitis, no h/o cirrhosis.    Denies  recent upper or lower GI bleeding.     / RENAL:   No h/o chronic kidney disease..    ENDOCRINE:   Denies  diabetes  Denies  thyroid problems     HEMATOLOGY:  Patient denies  bleeding/bruising easily.  Denies gum bleeding. Denies frequent nosebleeds.  Denies family history of bleeding disorders.  Last aspirin or blood thinner = NA    ONCOLOGY:  Negative hx.      MUSCULOSKELETAL:    Normal.    PSYCHIATRIC:    yes anxiety, or depression.   Denies suicidal ideation at this moment.  Denies illicit drug use,  ETOH use.    ANESTHESIA HISTORY:    Denies complications/ reactions to anesthesia.  (self or family)  Denies difficulty breathing post-op.  Denies difficult intubation.    ==================================================  Objective:  Vitals:    04/30/24 0814   BP: 128/68   Pulse: 68   Temp: 98 °F (36.7 °C)   TempSrc: Temporal   SpO2: 98%   Weight: 98 kg (216 lb)   Height: 1.829 m (6')       Physical Exam  Vitals and nursing note reviewed.   Constitutional:       General: He is not in acute distress.     Appearance: He is well-developed.   HENT:      Head: Normocephalic and atraumatic.   Cardiovascular:      Rate and Rhythm: Normal rate and regular rhythm.      Heart sounds: Normal heart sounds. No murmur heard.  Pulmonary:      Effort: Pulmonary effort is normal. No respiratory distress.      Breath sounds: Normal breath sounds. No wheezing.   Neurological:      Mental Status: He is alert and oriented to person, place, and time.         ==================================================  Assessment/Plan:    1. Lumbar spine pain  Cleared pending labs and pre admission testing 5/15.     2. Pre-op exam  No abnormal findings.         Instructions given and reviewed.     Pt advised :  IF THERE IS A  DEVELOPMENT OF AN OPEN WOUND, ABRASION, OR RASH POST

## 2024-05-08 ENCOUNTER — TELEPHONE (OUTPATIENT)
Dept: PAIN MEDICINE | Facility: CLINIC | Age: 39
End: 2024-05-08
Payer: MEDICAID

## 2024-05-14 ENCOUNTER — OFFICE VISIT (OUTPATIENT)
Dept: ORTHOPEDIC SURGERY | Facility: CLINIC | Age: 39
End: 2024-05-14
Payer: MEDICAID

## 2024-05-14 DIAGNOSIS — M54.10 BACK PAIN WITH RADICULOPATHY: Primary | ICD-10-CM

## 2024-05-14 NOTE — PROGRESS NOTES
Established patient here for a preop visit scheduled for an L5-S1 left microdiscectomy with a globus retractor coming in from the left side we discussed all risk benefits options it was explained to him on last visit he understands wants to proceed.    We vies him what activities avoid.  Postop, what medications avoid.  Postop, we vies and nothing to eat or drink after midnight we advised him do not take any blood thinning products he understands and will not take him.    He will be fitted for a LSO brace medically necessary to be worn postoperatively.  I guess again to come to the house to figure for that he has been contacted by our bracing company.    Physical exam: Well-nourished, well kept.  No lymphangitis or lymphadenopathy in the examined extremities.  Good perfusion to the lower extremities bilaterally.  Dorsalis pedis pulses 2+.  Capillary refill to the digits brisk.  No distal edema.  Gait normal.  Can walk on heels and toes.  There is decreased range of motion flexion-extension rotation lumbar spine mildly tender good strength no instability.   examination of the lower extremities reveals no point tenderness, swelling, or deformity.  Range of motion of the hips, knees, and ankles are full without crepitance, instability, or exacerbation of pain.  Strength is 5/5 throughout.  No redness, abrasions, or lesions on the lower extremities bilaterally.  Gross sensation intact to the lower extremity is bilaterally.  Affect normal.    Assessment this a patient with low back pain left leg symptoms wants to proceed with surgery.    Plan: Will proceed with an L5-S1 left microdiscectomy and he will follow-up postoperatively

## 2024-05-15 ENCOUNTER — HOSPITAL ENCOUNTER (OUTPATIENT)
Dept: PREADMISSION TESTING | Age: 39
Discharge: HOME | End: 2024-05-19
Attending: ORTHOPAEDIC SURGERY | Admitting: ORTHOPAEDIC SURGERY
Payer: COMMERCIAL

## 2024-05-15 VITALS
DIASTOLIC BLOOD PRESSURE: 78 MMHG | TEMPERATURE: 97.6 F | HEIGHT: 72 IN | BODY MASS INDEX: 30.31 KG/M2 | RESPIRATION RATE: 12 BRPM | HEART RATE: 76 BPM | OXYGEN SATURATION: 99 % | WEIGHT: 223.8 LBS | SYSTOLIC BLOOD PRESSURE: 129 MMHG

## 2024-05-15 LAB
ABO + RH BLD: NORMAL
ALBUMIN SERPL-MCNC: 4.2 G/DL (ref 3.5–4.6)
ALP SERPL-CCNC: 87 U/L (ref 35–104)
ALT SERPL-CCNC: 34 U/L (ref 0–41)
ANION GAP SERPL CALCULATED.3IONS-SCNC: 10 MEQ/L (ref 9–15)
APTT PPP: 29.6 SEC (ref 24.4–36.8)
AST SERPL-CCNC: 22 U/L (ref 0–40)
BASOPHILS # BLD: 0 K/UL (ref 0–0.2)
BASOPHILS NFR BLD: 0.6 %
BILIRUB SERPL-MCNC: <0.2 MG/DL (ref 0.2–0.7)
BILIRUB UR QL STRIP: NEGATIVE
BLD GP AB SCN SERPL QL: NORMAL
BUN SERPL-MCNC: 11 MG/DL (ref 6–20)
CALCIUM SERPL-MCNC: 9.4 MG/DL (ref 8.5–9.9)
CHLORIDE SERPL-SCNC: 105 MEQ/L (ref 95–107)
CLARITY UR: CLEAR
CO2 SERPL-SCNC: 25 MEQ/L (ref 20–31)
COLOR UR: YELLOW
CREAT SERPL-MCNC: 0.74 MG/DL (ref 0.7–1.2)
EOSINOPHIL # BLD: 0.2 K/UL (ref 0–0.7)
EOSINOPHIL NFR BLD: 3.2 %
ERYTHROCYTE [DISTWIDTH] IN BLOOD BY AUTOMATED COUNT: 12.9 % (ref 11.5–14.5)
GLOBULIN SER CALC-MCNC: 2.7 G/DL (ref 2.3–3.5)
GLUCOSE SERPL-MCNC: 131 MG/DL (ref 70–99)
GLUCOSE UR STRIP-MCNC: NEGATIVE MG/DL
HCT VFR BLD AUTO: 43.5 % (ref 42–52)
HGB BLD-MCNC: 14.8 G/DL (ref 14–18)
HGB UR QL STRIP: NEGATIVE
INR PPP: 1
KETONES UR STRIP-MCNC: NEGATIVE MG/DL
LEUKOCYTE ESTERASE UR QL STRIP: NEGATIVE
LYMPHOCYTES # BLD: 2.4 K/UL (ref 1–4.8)
LYMPHOCYTES NFR BLD: 45.5 %
MCH RBC QN AUTO: 29.5 PG (ref 27–31.3)
MCHC RBC AUTO-ENTMCNC: 34 % (ref 33–37)
MCV RBC AUTO: 86.7 FL (ref 79–92.2)
MONOCYTES # BLD: 0.5 K/UL (ref 0.2–0.8)
MONOCYTES NFR BLD: 8.8 %
NEUTROPHILS # BLD: 2.2 K/UL (ref 1.4–6.5)
NEUTS SEG NFR BLD: 41.5 %
NITRITE UR QL STRIP: NEGATIVE
PH UR STRIP: 5.5 [PH] (ref 5–9)
PLATELET # BLD AUTO: 172 K/UL (ref 130–400)
POTASSIUM SERPL-SCNC: 4.2 MEQ/L (ref 3.4–4.9)
PROT SERPL-MCNC: 6.9 G/DL (ref 6.3–8)
PROT UR STRIP-MCNC: NEGATIVE MG/DL
PROTHROMBIN TIME: 12.9 SEC (ref 12.3–14.9)
RBC # BLD AUTO: 5.02 M/UL (ref 4.7–6.1)
SODIUM SERPL-SCNC: 140 MEQ/L (ref 135–144)
SP GR UR STRIP: 1.02 (ref 1–1.03)
URINE REFLEX TO CULTURE: NORMAL
UROBILINOGEN UR STRIP-ACNC: 0.2 E.U./DL
WBC # BLD AUTO: 5.3 K/UL (ref 4.8–10.8)

## 2024-05-15 PROCEDURE — 86900 BLOOD TYPING SEROLOGIC ABO: CPT

## 2024-05-15 PROCEDURE — 86901 BLOOD TYPING SEROLOGIC RH(D): CPT

## 2024-05-15 PROCEDURE — 85025 COMPLETE CBC W/AUTO DIFF WBC: CPT

## 2024-05-15 PROCEDURE — 87641 MR-STAPH DNA AMP PROBE: CPT

## 2024-05-15 PROCEDURE — 86850 RBC ANTIBODY SCREEN: CPT

## 2024-05-15 PROCEDURE — 81003 URINALYSIS AUTO W/O SCOPE: CPT

## 2024-05-15 PROCEDURE — 85610 PROTHROMBIN TIME: CPT

## 2024-05-15 PROCEDURE — 80053 COMPREHEN METABOLIC PANEL: CPT

## 2024-05-15 PROCEDURE — 85730 THROMBOPLASTIN TIME PARTIAL: CPT

## 2024-05-15 RX ORDER — PRAZOSIN HYDROCHLORIDE 5 MG/1
5 CAPSULE ORAL NIGHTLY
COMMUNITY
Start: 2024-04-08 | End: 2025-04-09

## 2024-05-16 LAB
MRSA, DNA, NASAL: NEGATIVE
SPECIMEN DESCRIPTION: NORMAL

## 2024-05-22 ENCOUNTER — ANESTHESIA (OUTPATIENT)
Dept: OPERATING ROOM | Age: 39
End: 2024-05-22
Payer: COMMERCIAL

## 2024-05-22 ENCOUNTER — APPOINTMENT (OUTPATIENT)
Dept: GENERAL RADIOLOGY | Age: 39
End: 2024-05-22
Attending: ORTHOPAEDIC SURGERY
Payer: COMMERCIAL

## 2024-05-22 ENCOUNTER — ANESTHESIA EVENT (OUTPATIENT)
Dept: OPERATING ROOM | Age: 39
End: 2024-05-22
Payer: COMMERCIAL

## 2024-05-22 ENCOUNTER — HOSPITAL ENCOUNTER (OUTPATIENT)
Age: 39
Setting detail: OUTPATIENT SURGERY
Discharge: HOME | End: 2024-05-22
Attending: ORTHOPAEDIC SURGERY | Admitting: ORTHOPAEDIC SURGERY
Payer: COMMERCIAL

## 2024-05-22 VITALS
HEIGHT: 72 IN | BODY MASS INDEX: 30.2 KG/M2 | RESPIRATION RATE: 16 BRPM | DIASTOLIC BLOOD PRESSURE: 79 MMHG | WEIGHT: 223 LBS | TEMPERATURE: 97.7 F | SYSTOLIC BLOOD PRESSURE: 132 MMHG | OXYGEN SATURATION: 96 % | HEART RATE: 73 BPM

## 2024-05-22 DIAGNOSIS — M48.07 LUMBOSACRAL STENOSIS: ICD-10-CM

## 2024-05-22 DIAGNOSIS — M54.50 LUMBAR SPINE PAIN: Primary | ICD-10-CM

## 2024-05-22 PROCEDURE — 63030 LAMOT DCMPRN NRV RT 1 LMBR: CPT | Performed by: ORTHOPAEDIC SURGERY

## 2024-05-22 PROCEDURE — 2709999900 HC NON-CHARGEABLE SUPPLY: Performed by: ORTHOPAEDIC SURGERY

## 2024-05-22 PROCEDURE — 76942 ECHO GUIDE FOR BIOPSY: CPT | Performed by: ANESTHESIOLOGY

## 2024-05-22 PROCEDURE — 3700000000 HC ANESTHESIA ATTENDED CARE: Performed by: ORTHOPAEDIC SURGERY

## 2024-05-22 PROCEDURE — 2580000003 HC RX 258: Performed by: ORTHOPAEDIC SURGERY

## 2024-05-22 PROCEDURE — 7100000000 HC PACU RECOVERY - FIRST 15 MIN: Performed by: ORTHOPAEDIC SURGERY

## 2024-05-22 PROCEDURE — 3600000014 HC SURGERY LEVEL 4 ADDTL 15MIN: Performed by: ORTHOPAEDIC SURGERY

## 2024-05-22 PROCEDURE — 88304 TISSUE EXAM BY PATHOLOGIST: CPT

## 2024-05-22 PROCEDURE — 7100000001 HC PACU RECOVERY - ADDTL 15 MIN: Performed by: ORTHOPAEDIC SURGERY

## 2024-05-22 PROCEDURE — 64635 DESTROY LUMB/SAC FACET JNT: CPT | Performed by: ORTHOPAEDIC SURGERY

## 2024-05-22 PROCEDURE — 2720000010 HC SURG SUPPLY STERILE: Performed by: ORTHOPAEDIC SURGERY

## 2024-05-22 PROCEDURE — 7100000010 HC PHASE II RECOVERY - FIRST 15 MIN: Performed by: ORTHOPAEDIC SURGERY

## 2024-05-22 PROCEDURE — 2500000003 HC RX 250 WO HCPCS: Performed by: ANESTHESIOLOGY

## 2024-05-22 PROCEDURE — 7100000011 HC PHASE II RECOVERY - ADDTL 15 MIN: Performed by: ORTHOPAEDIC SURGERY

## 2024-05-22 PROCEDURE — 3600000004 HC SURGERY LEVEL 4 BASE: Performed by: ORTHOPAEDIC SURGERY

## 2024-05-22 PROCEDURE — 2580000003 HC RX 258: Performed by: ANESTHESIOLOGY

## 2024-05-22 PROCEDURE — 6360000002 HC RX W HCPCS: Performed by: ANESTHESIOLOGIST ASSISTANT

## 2024-05-22 PROCEDURE — 6360000002 HC RX W HCPCS: Performed by: ORTHOPAEDIC SURGERY

## 2024-05-22 PROCEDURE — 6360000002 HC RX W HCPCS: Performed by: ANESTHESIOLOGY

## 2024-05-22 PROCEDURE — 6370000000 HC RX 637 (ALT 250 FOR IP): Performed by: ORTHOPAEDIC SURGERY

## 2024-05-22 PROCEDURE — 6370000000 HC RX 637 (ALT 250 FOR IP): Performed by: ANESTHESIOLOGY

## 2024-05-22 PROCEDURE — 2500000003 HC RX 250 WO HCPCS: Performed by: ORTHOPAEDIC SURGERY

## 2024-05-22 PROCEDURE — A4217 STERILE WATER/SALINE, 500 ML: HCPCS | Performed by: ORTHOPAEDIC SURGERY

## 2024-05-22 PROCEDURE — 3700000001 HC ADD 15 MINUTES (ANESTHESIA): Performed by: ORTHOPAEDIC SURGERY

## 2024-05-22 PROCEDURE — C1713 ANCHOR/SCREW BN/BN,TIS/BN: HCPCS | Performed by: ORTHOPAEDIC SURGERY

## 2024-05-22 PROCEDURE — 2500000003 HC RX 250 WO HCPCS: Performed by: ANESTHESIOLOGIST ASSISTANT

## 2024-05-22 PROCEDURE — 63030 LAMOT DCMPRN NRV RT 1 LMBR: CPT | Performed by: PHYSICIAN ASSISTANT

## 2024-05-22 RX ORDER — MIDAZOLAM HYDROCHLORIDE 1 MG/ML
INJECTION INTRAMUSCULAR; INTRAVENOUS PRN
Status: DISCONTINUED | OUTPATIENT
Start: 2024-05-22 | End: 2024-05-22 | Stop reason: SDUPTHER

## 2024-05-22 RX ORDER — ROCURONIUM BROMIDE 10 MG/ML
INJECTION, SOLUTION INTRAVENOUS PRN
Status: DISCONTINUED | OUTPATIENT
Start: 2024-05-22 | End: 2024-05-22 | Stop reason: SDUPTHER

## 2024-05-22 RX ORDER — SODIUM CHLORIDE 0.9 % (FLUSH) 0.9 %
5-40 SYRINGE (ML) INJECTION PRN
Status: DISCONTINUED | OUTPATIENT
Start: 2024-05-22 | End: 2024-05-22 | Stop reason: HOSPADM

## 2024-05-22 RX ORDER — DEXTROSE MONOHYDRATE 100 MG/ML
INJECTION, SOLUTION INTRAVENOUS CONTINUOUS PRN
Status: DISCONTINUED | OUTPATIENT
Start: 2024-05-22 | End: 2024-05-22 | Stop reason: HOSPADM

## 2024-05-22 RX ORDER — FENTANYL CITRATE 50 UG/ML
INJECTION, SOLUTION INTRAMUSCULAR; INTRAVENOUS PRN
Status: DISCONTINUED | OUTPATIENT
Start: 2024-05-22 | End: 2024-05-22 | Stop reason: SDUPTHER

## 2024-05-22 RX ORDER — OXYCODONE HYDROCHLORIDE 5 MG/1
10 TABLET ORAL PRN
Status: COMPLETED | OUTPATIENT
Start: 2024-05-22 | End: 2024-05-22

## 2024-05-22 RX ORDER — SODIUM CHLORIDE, SODIUM LACTATE, POTASSIUM CHLORIDE, CALCIUM CHLORIDE 600; 310; 30; 20 MG/100ML; MG/100ML; MG/100ML; MG/100ML
INJECTION, SOLUTION INTRAVENOUS CONTINUOUS
Status: DISCONTINUED | OUTPATIENT
Start: 2024-05-22 | End: 2024-05-22 | Stop reason: HOSPADM

## 2024-05-22 RX ORDER — SODIUM CHLORIDE 0.9 % (FLUSH) 0.9 %
5-40 SYRINGE (ML) INJECTION EVERY 12 HOURS SCHEDULED
Status: DISCONTINUED | OUTPATIENT
Start: 2024-05-22 | End: 2024-05-22 | Stop reason: HOSPADM

## 2024-05-22 RX ORDER — NALOXONE HYDROCHLORIDE 0.4 MG/ML
INJECTION, SOLUTION INTRAMUSCULAR; INTRAVENOUS; SUBCUTANEOUS PRN
Status: DISCONTINUED | OUTPATIENT
Start: 2024-05-22 | End: 2024-05-22 | Stop reason: HOSPADM

## 2024-05-22 RX ORDER — LIDOCAINE HYDROCHLORIDE AND EPINEPHRINE 10; 10 MG/ML; UG/ML
INJECTION, SOLUTION INFILTRATION; PERINEURAL PRN
Status: DISCONTINUED | OUTPATIENT
Start: 2024-05-22 | End: 2024-05-22 | Stop reason: ALTCHOICE

## 2024-05-22 RX ORDER — MAGNESIUM HYDROXIDE 1200 MG/15ML
LIQUID ORAL CONTINUOUS PRN
Status: COMPLETED | OUTPATIENT
Start: 2024-05-22 | End: 2024-05-22

## 2024-05-22 RX ORDER — KETOROLAC TROMETHAMINE 30 MG/ML
INJECTION, SOLUTION INTRAMUSCULAR; INTRAVENOUS PRN
Status: DISCONTINUED | OUTPATIENT
Start: 2024-05-22 | End: 2024-05-22 | Stop reason: SDUPTHER

## 2024-05-22 RX ORDER — OXYCODONE HYDROCHLORIDE AND ACETAMINOPHEN 5; 325 MG/1; MG/1
1 TABLET ORAL EVERY 6 HOURS PRN
Qty: 20 TABLET | Refills: 0 | Status: SHIPPED | OUTPATIENT
Start: 2024-05-22 | End: 2024-05-27

## 2024-05-22 RX ORDER — ONDANSETRON 2 MG/ML
INJECTION INTRAMUSCULAR; INTRAVENOUS PRN
Status: DISCONTINUED | OUTPATIENT
Start: 2024-05-22 | End: 2024-05-22 | Stop reason: SDUPTHER

## 2024-05-22 RX ORDER — OXYCODONE HYDROCHLORIDE 5 MG/1
5 TABLET ORAL PRN
Status: COMPLETED | OUTPATIENT
Start: 2024-05-22 | End: 2024-05-22

## 2024-05-22 RX ORDER — METOCLOPRAMIDE HYDROCHLORIDE 5 MG/ML
10 INJECTION INTRAMUSCULAR; INTRAVENOUS
Status: DISCONTINUED | OUTPATIENT
Start: 2024-05-22 | End: 2024-05-22 | Stop reason: HOSPADM

## 2024-05-22 RX ORDER — SODIUM CHLORIDE 9 MG/ML
INJECTION, SOLUTION INTRAVENOUS PRN
Status: DISCONTINUED | OUTPATIENT
Start: 2024-05-22 | End: 2024-05-22 | Stop reason: HOSPADM

## 2024-05-22 RX ORDER — FENTANYL CITRATE 0.05 MG/ML
25 INJECTION, SOLUTION INTRAMUSCULAR; INTRAVENOUS EVERY 5 MIN PRN
Status: DISCONTINUED | OUTPATIENT
Start: 2024-05-22 | End: 2024-05-22 | Stop reason: HOSPADM

## 2024-05-22 RX ORDER — DEXAMETHASONE SODIUM PHOSPHATE 10 MG/ML
INJECTION INTRAMUSCULAR; INTRAVENOUS PRN
Status: DISCONTINUED | OUTPATIENT
Start: 2024-05-22 | End: 2024-05-22 | Stop reason: SDUPTHER

## 2024-05-22 RX ORDER — DEXMEDETOMIDINE HYDROCHLORIDE 100 UG/ML
INJECTION, SOLUTION INTRAVENOUS PRN
Status: DISCONTINUED | OUTPATIENT
Start: 2024-05-22 | End: 2024-05-22 | Stop reason: SDUPTHER

## 2024-05-22 RX ORDER — LABETALOL HYDROCHLORIDE 5 MG/ML
10 INJECTION, SOLUTION INTRAVENOUS
Status: DISCONTINUED | OUTPATIENT
Start: 2024-05-22 | End: 2024-05-22 | Stop reason: HOSPADM

## 2024-05-22 RX ORDER — IPRATROPIUM BROMIDE AND ALBUTEROL SULFATE 2.5; .5 MG/3ML; MG/3ML
1 SOLUTION RESPIRATORY (INHALATION)
Status: DISCONTINUED | OUTPATIENT
Start: 2024-05-22 | End: 2024-05-22 | Stop reason: HOSPADM

## 2024-05-22 RX ORDER — ONDANSETRON 2 MG/ML
4 INJECTION INTRAMUSCULAR; INTRAVENOUS
Status: DISCONTINUED | OUTPATIENT
Start: 2024-05-22 | End: 2024-05-22 | Stop reason: HOSPADM

## 2024-05-22 RX ORDER — MEPERIDINE HYDROCHLORIDE 25 MG/ML
12.5 INJECTION INTRAMUSCULAR; INTRAVENOUS; SUBCUTANEOUS EVERY 5 MIN PRN
Status: DISCONTINUED | OUTPATIENT
Start: 2024-05-22 | End: 2024-05-22 | Stop reason: HOSPADM

## 2024-05-22 RX ORDER — PROPOFOL 10 MG/ML
INJECTION, EMULSION INTRAVENOUS PRN
Status: DISCONTINUED | OUTPATIENT
Start: 2024-05-22 | End: 2024-05-22 | Stop reason: SDUPTHER

## 2024-05-22 RX ORDER — LIDOCAINE HYDROCHLORIDE 10 MG/ML
1 INJECTION, SOLUTION EPIDURAL; INFILTRATION; INTRACAUDAL; PERINEURAL
Status: DISCONTINUED | OUTPATIENT
Start: 2024-05-22 | End: 2024-05-22 | Stop reason: HOSPADM

## 2024-05-22 RX ORDER — LIDOCAINE HYDROCHLORIDE 20 MG/ML
INJECTION, SOLUTION INTRAVENOUS PRN
Status: DISCONTINUED | OUTPATIENT
Start: 2024-05-22 | End: 2024-05-22 | Stop reason: SDUPTHER

## 2024-05-22 RX ORDER — WOUND DRESSING ADHESIVE - LIQUID
LIQUID MISCELLANEOUS PRN
Status: DISCONTINUED | OUTPATIENT
Start: 2024-05-22 | End: 2024-05-22 | Stop reason: ALTCHOICE

## 2024-05-22 RX ORDER — KETAMINE HYDROCHLORIDE 50 MG/ML
50 INJECTION, SOLUTION INTRAMUSCULAR; INTRAVENOUS ONCE
Status: COMPLETED | OUTPATIENT
Start: 2024-05-22 | End: 2024-05-22

## 2024-05-22 RX ORDER — GLUCAGON 1 MG/ML
1 KIT INJECTION PRN
Status: DISCONTINUED | OUTPATIENT
Start: 2024-05-22 | End: 2024-05-22 | Stop reason: HOSPADM

## 2024-05-22 RX ORDER — HYDRALAZINE HYDROCHLORIDE 20 MG/ML
10 INJECTION INTRAMUSCULAR; INTRAVENOUS
Status: DISCONTINUED | OUTPATIENT
Start: 2024-05-22 | End: 2024-05-22 | Stop reason: HOSPADM

## 2024-05-22 RX ORDER — BUPIVACAINE HYDROCHLORIDE 2.5 MG/ML
INJECTION, SOLUTION EPIDURAL; INFILTRATION; INTRACAUDAL
Status: COMPLETED | OUTPATIENT
Start: 2024-05-22 | End: 2024-05-22

## 2024-05-22 RX ADMIN — DEXMEDETOMIDINE 12 MCG: 100 INJECTION, SOLUTION INTRAVENOUS at 08:21

## 2024-05-22 RX ADMIN — MIDAZOLAM HYDROCHLORIDE 2 MG: 1 INJECTION, SOLUTION INTRAMUSCULAR; INTRAVENOUS at 07:39

## 2024-05-22 RX ADMIN — PHENYLEPHRINE HYDROCHLORIDE 200 MCG: 10 INJECTION INTRAVENOUS at 08:37

## 2024-05-22 RX ADMIN — BUPIVACAINE HYDROCHLORIDE 60 ML: 2.5 INJECTION, SOLUTION EPIDURAL; INFILTRATION; INTRACAUDAL at 07:06

## 2024-05-22 RX ADMIN — SUGAMMADEX 400 MG: 100 INJECTION, SOLUTION INTRAVENOUS at 09:04

## 2024-05-22 RX ADMIN — HYDROMORPHONE HYDROCHLORIDE 0.5 MG: 1 INJECTION, SOLUTION INTRAMUSCULAR; INTRAVENOUS; SUBCUTANEOUS at 09:36

## 2024-05-22 RX ADMIN — ONDANSETRON 4 MG: 2 INJECTION INTRAMUSCULAR; INTRAVENOUS at 08:53

## 2024-05-22 RX ADMIN — KETAMINE HYDROCHLORIDE 20 MG: 50 INJECTION INTRAMUSCULAR; INTRAVENOUS at 07:55

## 2024-05-22 RX ADMIN — OXYCODONE 5 MG: 5 TABLET ORAL at 10:26

## 2024-05-22 RX ADMIN — ROCURONIUM BROMIDE 100 MG: 10 INJECTION, SOLUTION INTRAVENOUS at 07:43

## 2024-05-22 RX ADMIN — DEXMEDETOMIDINE 8 MCG: 100 INJECTION, SOLUTION INTRAVENOUS at 08:56

## 2024-05-22 RX ADMIN — SODIUM CHLORIDE, POTASSIUM CHLORIDE, SODIUM LACTATE AND CALCIUM CHLORIDE: 600; 310; 30; 20 INJECTION, SOLUTION INTRAVENOUS at 06:24

## 2024-05-22 RX ADMIN — LIDOCAINE HYDROCHLORIDE 80 MG: 20 INJECTION, SOLUTION INTRAVENOUS at 07:43

## 2024-05-22 RX ADMIN — PHENYLEPHRINE HYDROCHLORIDE 250 MCG: 10 INJECTION INTRAVENOUS at 08:48

## 2024-05-22 RX ADMIN — KETAMINE HYDROCHLORIDE 10 MG: 50 INJECTION INTRAMUSCULAR; INTRAVENOUS at 08:56

## 2024-05-22 RX ADMIN — MIDAZOLAM HYDROCHLORIDE 2 MG: 1 INJECTION, SOLUTION INTRAMUSCULAR; INTRAVENOUS at 07:05

## 2024-05-22 RX ADMIN — PHENYLEPHRINE HYDROCHLORIDE 200 MCG: 10 INJECTION INTRAVENOUS at 08:56

## 2024-05-22 RX ADMIN — DEXAMETHASONE SODIUM PHOSPHATE 10 MG: 10 INJECTION INTRAMUSCULAR; INTRAVENOUS at 07:55

## 2024-05-22 RX ADMIN — HYDROMORPHONE HYDROCHLORIDE 0.5 MG: 1 INJECTION, SOLUTION INTRAMUSCULAR; INTRAVENOUS; SUBCUTANEOUS at 09:46

## 2024-05-22 RX ADMIN — PROPOFOL 200 MG: 10 INJECTION, EMULSION INTRAVENOUS at 07:43

## 2024-05-22 RX ADMIN — PHENYLEPHRINE HYDROCHLORIDE 150 MCG: 10 INJECTION INTRAVENOUS at 08:33

## 2024-05-22 RX ADMIN — KETAMINE HYDROCHLORIDE 20 MG: 50 INJECTION INTRAMUSCULAR; INTRAVENOUS at 08:48

## 2024-05-22 RX ADMIN — FENTANYL CITRATE 50 MCG: 50 INJECTION, SOLUTION INTRAMUSCULAR; INTRAVENOUS at 07:43

## 2024-05-22 RX ADMIN — KETOROLAC TROMETHAMINE 15 MG: 30 INJECTION INTRAMUSCULAR; INTRAVENOUS at 08:53

## 2024-05-22 RX ADMIN — CEFAZOLIN 2000 MG: 2 INJECTION, POWDER, FOR SOLUTION INTRAMUSCULAR; INTRAVENOUS at 07:54

## 2024-05-22 RX ADMIN — FENTANYL CITRATE 50 MCG: 50 INJECTION, SOLUTION INTRAMUSCULAR; INTRAVENOUS at 07:55

## 2024-05-22 ASSESSMENT — PAIN DESCRIPTION - LOCATION
LOCATION: BACK

## 2024-05-22 ASSESSMENT — PAIN DESCRIPTION - ORIENTATION
ORIENTATION: LOWER

## 2024-05-22 ASSESSMENT — PAIN SCALES - GENERAL
PAINLEVEL_OUTOF10: 7
PAINLEVEL_OUTOF10: 6
PAINLEVEL_OUTOF10: 5
PAINLEVEL_OUTOF10: 6
PAINLEVEL_OUTOF10: 7

## 2024-05-22 ASSESSMENT — PAIN DESCRIPTION - DESCRIPTORS: DESCRIPTORS: DISCOMFORT

## 2024-05-22 NOTE — OP NOTE
Operative Note      Patient: Mike Leiva  YOB: 1985  MRN: [id number]    Date of Procedure: 5/22/2024    Pre-Op Diagnosis Codes:     * Lumbosacral stenosis [M48.07]    Post-Op Diagnosis: Same       Procedure(s):  LUMBAR SPINE L5-S1 LEFT MICRODISCECTOMY PRONE, AXIS TABLE, NEW MICROSCOPE, NEW C-ARM, LUMBAR BRACE, GLOBUS EQUIPMENT MIS TUBES-e-mailed Berto Joana aware    Surgeon(s):  Deni Hernández MD    Assistant:   Physician Assistant: Zachariah Crandall PA    Anesthesia: Choice    Estimated Blood Loss (mL): Minimal    Complications: None    Specimens:   ID Type Source Tests Collected by Time Destination   A : disc 5-1 left Tissue Back SURGICAL PATHOLOGY Deni Hernández MD 5/22/2024 0840        Implants:  * No implants in log *      Drains: * No LDAs found *    Findings:  Infection Present At Time Of Surgery (PATOS) (choose all levels that have infection present):  No infection present  Other Findings: L5-S1 left herniated disc    Detailed Description of Procedure:      Preoperative diagnosis: L5-S1 left herniated disc.  Lumbar spondylosis.    Postop diagnosis: L5-S1 herniated disc left.  Lumbar spondylosis.      Procedure: 1) L5-S1 left microdiscectomy                    2) thermal ablation of the medial nerve branch of the left L5-S1 facet.    Surgeon: Deni Hernández M.D.    Asst.: Zachariah LAINEZ  The physician assistant was present through the entire case.  Given the nature of the disease process and the procedure to be performed a skilled surgical assistant was necessary during the case.  The assistant was necessary in order to hold retractors and directly assist in the operation.  A certified scrub tech was at the back table managing instruments and supplies for the surgical case.    Anesthesia: General    HPI: The patient had leg symptoms from the above-described herniated disc.  The patient failed all nonoperative treatment.  All of the risks, benefits, and potential complications for  At the conclusion of the case the patient's toes were pink and warm with brisk capillary refill.  The patient tolerated the procedure well.        This dictation was created using voice recognition software.  If there are any questions about inaccuracies please do not hesitate to contact me.    Electronically signed by Deni Hernández MD on 5/22/2024 at 9:02 AM

## 2024-05-22 NOTE — ANESTHESIA POSTPROCEDURE EVALUATION
Department of Anesthesiology  Postprocedure Note    Patient: Mike Leiva  MRN: [id number]  YOB: 1985  Date of evaluation: 5/22/2024    Procedure Summary       Date: 05/22/24 Room / Location: [address]    Anesthesia Start: 0739 Anesthesia Stop:     Procedure: LUMBAR SPINE L5-S1 LEFT MICRODISCECTOMY PRONE, AXIS TABLE, NEW MICROSCOPE, NEW C-ARM, LUMBAR BRACE, GLOBUS EQUIPMENT MIS TUBES-e-mailed Berto mendez Diagnosis:       Lumbosacral stenosis      (Lumbosacral stenosis [M48.07])    Surgeons: Deni Hernández MD Responsible Provider: Mir Santana MD    Anesthesia Type: general, regional ASA Status: 2            Anesthesia Type: No value filed.    Ankit Phase I: Ankit Score: 5    Ankit Phase II:      Anesthesia Post Evaluation    Patient location during evaluation: bedside  Patient participation: complete - patient participated  Level of consciousness: awake and awake and alert  Airway patency: patent  Nausea & Vomiting: no nausea and no vomiting  Cardiovascular status: blood pressure returned to baseline and hemodynamically stable  Respiratory status: acceptable  Hydration status: euvolemic  Multimodal analgesia pain management approach  Pain management: adequate        No notable events documented.

## 2024-05-22 NOTE — ANESTHESIA PROCEDURE NOTES
Peripheral Block    Patient location during procedure: pre-op  Reason for block: post-op pain management and at surgeon's request  Start time: 5/22/2024 7:06 AM  End time: 5/22/2024 7:16 AM  Staffing  Performed: anesthesiologist   Anesthesiologist: Mir Santana MD  Performed by: Mir Santana MD  Authorized by: Mir Santana MD    Preanesthetic Checklist  Completed: patient identified, IV checked, site marked, risks and benefits discussed, surgical/procedural consents, equipment checked, pre-op evaluation, timeout performed, anesthesia consent given, oxygen available and monitors applied/VS acknowledged  Peripheral Block   Patient position: supine  Prep: ChloraPrep  Provider prep: mask and sterile gloves (Sterile probe cover)  Patient monitoring: cardiac monitor, continuous pulse ox, frequent blood pressure checks and IV access  Block type: Erector spinae  Laterality: bilateral  Injection technique: single-shot  Guidance: nerve stimulator and ultrasound guided  Local infiltration: bupivacaine  Infiltration strength: 0.25 %  Local infiltration: bupivacaine  Dose: 60 mL    Needle   Needle type: combined needle/nerve stimulator   Needle gauge: 22 G  Needle localization: anatomical landmarks and ultrasound guidance  Needle length: 5 cm  Assessment   Injection assessment: negative aspiration for heme, no paresthesia on injection and local visualized surrounding nerve on ultrasound  Paresthesia pain: immediately resolved  Slow fractionated injection: yes  Hemodynamics: stable    Additional Notes  Ultrasound image printed and saved in patient chart.    Sterile probe cover used    Medications Administered  BUPivacaine (MARCAINE) PF injection 0.25% - Perineural   60 mL - 5/22/2024 7:06:00 AM

## 2024-05-22 NOTE — ANESTHESIA PRE PROCEDURE
administered.  Anesthetic plan and risks discussed with patient.      Plan discussed with CRNA and CAA.    Attending anesthesiologist reviewed and agrees with Pre Eval content      Post-op pain plan if not by surgeon: single peripheral nerve block        Mir Santana MD   5/22/2024

## 2024-05-22 NOTE — PROGRESS NOTES
CLINICAL PHARMACY NOTE: MEDS TO BEDS    Total # of Prescriptions Filled: 1   The following medications were delivered to the patient:  Oxycodone/APAP 5-325 mg tab    Additional Documentation:

## 2024-05-23 ENCOUNTER — APPOINTMENT (OUTPATIENT)
Dept: CT IMAGING | Age: 39
End: 2024-05-23
Payer: COMMERCIAL

## 2024-05-23 ENCOUNTER — HOSPITAL ENCOUNTER (EMERGENCY)
Age: 39
Discharge: HOME | End: 2024-05-23
Payer: COMMERCIAL

## 2024-05-23 VITALS
HEIGHT: 72 IN | WEIGHT: 224 LBS | RESPIRATION RATE: 18 BRPM | OXYGEN SATURATION: 97 % | DIASTOLIC BLOOD PRESSURE: 74 MMHG | SYSTOLIC BLOOD PRESSURE: 131 MMHG | BODY MASS INDEX: 30.34 KG/M2 | HEART RATE: 78 BPM | TEMPERATURE: 97.7 F

## 2024-05-23 DIAGNOSIS — G89.18 ACUTE POST-OPERATIVE PAIN: Primary | ICD-10-CM

## 2024-05-23 LAB
ALBUMIN SERPL-MCNC: 4.3 G/DL (ref 3.5–4.6)
ALP SERPL-CCNC: 78 U/L (ref 35–104)
ALT SERPL-CCNC: 21 U/L (ref 0–41)
ANION GAP SERPL CALCULATED.3IONS-SCNC: 11 MEQ/L (ref 9–15)
AST SERPL-CCNC: 23 U/L (ref 0–40)
BASOPHILS # BLD: 0 K/UL (ref 0–0.2)
BASOPHILS NFR BLD: 0.2 %
BILIRUB SERPL-MCNC: <0.2 MG/DL (ref 0.2–0.7)
BNP BLD-MCNC: 70 PG/ML
BUN SERPL-MCNC: 16 MG/DL (ref 6–20)
CALCIUM SERPL-MCNC: 9 MG/DL (ref 8.5–9.9)
CHLORIDE SERPL-SCNC: 106 MEQ/L (ref 95–107)
CO2 SERPL-SCNC: 24 MEQ/L (ref 20–31)
CREAT SERPL-MCNC: 0.8 MG/DL (ref 0.7–1.2)
EOSINOPHIL # BLD: 0 K/UL (ref 0–0.7)
EOSINOPHIL NFR BLD: 0.3 %
ERYTHROCYTE [DISTWIDTH] IN BLOOD BY AUTOMATED COUNT: 13.3 % (ref 11.5–14.5)
GLOBULIN SER CALC-MCNC: 2.7 G/DL (ref 2.3–3.5)
GLUCOSE SERPL-MCNC: 154 MG/DL (ref 70–99)
HCT VFR BLD AUTO: 39.8 % (ref 42–52)
HGB BLD-MCNC: 13.1 G/DL (ref 14–18)
LYMPHOCYTES # BLD: 2.6 K/UL (ref 1–4.8)
LYMPHOCYTES NFR BLD: 28.4 %
MCH RBC QN AUTO: 29.1 PG (ref 27–31.3)
MCHC RBC AUTO-ENTMCNC: 32.9 % (ref 33–37)
MCV RBC AUTO: 88.4 FL (ref 79–92.2)
MONOCYTES # BLD: 0.6 K/UL (ref 0.2–0.8)
MONOCYTES NFR BLD: 6.3 %
NEUTROPHILS # BLD: 5.8 K/UL (ref 1.4–6.5)
NEUTS SEG NFR BLD: 64.5 %
PLATELET # BLD AUTO: 166 K/UL (ref 130–400)
POTASSIUM SERPL-SCNC: 4.6 MEQ/L (ref 3.4–4.9)
PROT SERPL-MCNC: 7 G/DL (ref 6.3–8)
RBC # BLD AUTO: 4.5 M/UL (ref 4.7–6.1)
SODIUM SERPL-SCNC: 141 MEQ/L (ref 135–144)
WBC # BLD AUTO: 9 K/UL (ref 4.8–10.8)

## 2024-05-23 PROCEDURE — 85025 COMPLETE CBC W/AUTO DIFF WBC: CPT

## 2024-05-23 PROCEDURE — 2580000003 HC RX 258: Performed by: PHYSICIAN ASSISTANT

## 2024-05-23 PROCEDURE — 72132 CT LUMBAR SPINE W/DYE: CPT

## 2024-05-23 PROCEDURE — 99285 EMERGENCY DEPT VISIT HI MDM: CPT

## 2024-05-23 PROCEDURE — 6360000004 HC RX CONTRAST MEDICATION: Performed by: PHYSICIAN ASSISTANT

## 2024-05-23 PROCEDURE — 80053 COMPREHEN METABOLIC PANEL: CPT

## 2024-05-23 PROCEDURE — 96376 TX/PRO/DX INJ SAME DRUG ADON: CPT

## 2024-05-23 PROCEDURE — 96374 THER/PROPH/DIAG INJ IV PUSH: CPT

## 2024-05-23 PROCEDURE — 83880 ASSAY OF NATRIURETIC PEPTIDE: CPT

## 2024-05-23 PROCEDURE — 96375 TX/PRO/DX INJ NEW DRUG ADDON: CPT

## 2024-05-23 PROCEDURE — 6360000002 HC RX W HCPCS: Performed by: PHYSICIAN ASSISTANT

## 2024-05-23 PROCEDURE — 36415 COLL VENOUS BLD VENIPUNCTURE: CPT

## 2024-05-23 RX ORDER — KETOROLAC TROMETHAMINE 30 MG/ML
30 INJECTION, SOLUTION INTRAMUSCULAR; INTRAVENOUS ONCE
Status: COMPLETED | OUTPATIENT
Start: 2024-05-23 | End: 2024-05-23

## 2024-05-23 RX ORDER — 0.9 % SODIUM CHLORIDE 0.9 %
1000 INTRAVENOUS SOLUTION INTRAVENOUS ONCE
Status: COMPLETED | OUTPATIENT
Start: 2024-05-23 | End: 2024-05-23

## 2024-05-23 RX ADMIN — HYDROMORPHONE HYDROCHLORIDE 0.5 MG: 1 INJECTION, SOLUTION INTRAMUSCULAR; INTRAVENOUS; SUBCUTANEOUS at 22:28

## 2024-05-23 RX ADMIN — HYDROMORPHONE HYDROCHLORIDE 0.5 MG: 1 INJECTION, SOLUTION INTRAMUSCULAR; INTRAVENOUS; SUBCUTANEOUS at 21:35

## 2024-05-23 RX ADMIN — SODIUM CHLORIDE 1000 ML: 9 INJECTION, SOLUTION INTRAVENOUS at 21:34

## 2024-05-23 RX ADMIN — KETOROLAC TROMETHAMINE 30 MG: 30 INJECTION INTRAMUSCULAR; INTRAVENOUS at 22:28

## 2024-05-23 RX ADMIN — IOPAMIDOL 75 ML: 755 INJECTION, SOLUTION INTRAVENOUS at 22:19

## 2024-05-23 ASSESSMENT — ENCOUNTER SYMPTOMS
BACK PAIN: 1
SHORTNESS OF BREATH: 0
CHEST TIGHTNESS: 0

## 2024-05-23 ASSESSMENT — LIFESTYLE VARIABLES
HOW OFTEN DO YOU HAVE A DRINK CONTAINING ALCOHOL: NEVER
HOW MANY STANDARD DRINKS CONTAINING ALCOHOL DO YOU HAVE ON A TYPICAL DAY: PATIENT DOES NOT DRINK

## 2024-05-23 ASSESSMENT — PAIN DESCRIPTION - LOCATION: LOCATION: LEG

## 2024-05-23 ASSESSMENT — PAIN SCALES - GENERAL
PAINLEVEL_OUTOF10: 8
PAINLEVEL_OUTOF10: 9

## 2024-05-23 ASSESSMENT — PAIN DESCRIPTION - ORIENTATION: ORIENTATION: RIGHT;LEFT

## 2024-05-23 ASSESSMENT — PAIN DESCRIPTION - DESCRIPTORS: DESCRIPTORS: ACHING

## 2024-05-24 ENCOUNTER — APPOINTMENT (OUTPATIENT)
Dept: RADIOLOGY | Facility: HOSPITAL | Age: 39
End: 2024-05-24
Payer: MEDICAID

## 2024-05-24 ENCOUNTER — HOSPITAL ENCOUNTER (EMERGENCY)
Facility: HOSPITAL | Age: 39
Discharge: HOME | End: 2024-05-24
Attending: STUDENT IN AN ORGANIZED HEALTH CARE EDUCATION/TRAINING PROGRAM
Payer: MEDICAID

## 2024-05-24 VITALS
BODY MASS INDEX: 30.2 KG/M2 | OXYGEN SATURATION: 98 % | WEIGHT: 223 LBS | DIASTOLIC BLOOD PRESSURE: 62 MMHG | TEMPERATURE: 98.8 F | HEART RATE: 64 BPM | RESPIRATION RATE: 18 BRPM | SYSTOLIC BLOOD PRESSURE: 138 MMHG | HEIGHT: 72 IN

## 2024-05-24 DIAGNOSIS — M54.50 ACUTE LEFT-SIDED LOW BACK PAIN WITHOUT SCIATICA: Primary | ICD-10-CM

## 2024-05-24 LAB
ALBUMIN SERPL BCP-MCNC: 3.9 G/DL (ref 3.4–5)
ALP SERPL-CCNC: 54 U/L (ref 33–120)
ALT SERPL W P-5'-P-CCNC: 17 U/L (ref 10–52)
ANION GAP SERPL CALC-SCNC: 11 MMOL/L (ref 10–20)
AST SERPL W P-5'-P-CCNC: 19 U/L (ref 9–39)
BASOPHILS # BLD AUTO: 0.04 X10*3/UL (ref 0–0.1)
BASOPHILS NFR BLD AUTO: 0.5 %
BILIRUB SERPL-MCNC: 0.3 MG/DL (ref 0–1.2)
BUN SERPL-MCNC: 12 MG/DL (ref 6–23)
CALCIUM SERPL-MCNC: 8.9 MG/DL (ref 8.6–10.3)
CHLORIDE SERPL-SCNC: 107 MMOL/L (ref 98–107)
CO2 SERPL-SCNC: 25 MMOL/L (ref 21–32)
CREAT SERPL-MCNC: 0.91 MG/DL (ref 0.5–1.3)
EGFRCR SERPLBLD CKD-EPI 2021: >90 ML/MIN/1.73M*2
EOSINOPHIL # BLD AUTO: 0.1 X10*3/UL (ref 0–0.7)
EOSINOPHIL NFR BLD AUTO: 1.3 %
ERYTHROCYTE [DISTWIDTH] IN BLOOD BY AUTOMATED COUNT: 13.4 % (ref 11.5–14.5)
GLUCOSE SERPL-MCNC: 83 MG/DL (ref 74–99)
HCT VFR BLD AUTO: 40.1 % (ref 41–52)
HGB BLD-MCNC: 13.5 G/DL (ref 13.5–17.5)
IMM GRANULOCYTES # BLD AUTO: 0.02 X10*3/UL (ref 0–0.7)
IMM GRANULOCYTES NFR BLD AUTO: 0.3 % (ref 0–0.9)
LYMPHOCYTES # BLD AUTO: 2.9 X10*3/UL (ref 1.2–4.8)
LYMPHOCYTES NFR BLD AUTO: 37.7 %
MCH RBC QN AUTO: 30.3 PG (ref 26–34)
MCHC RBC AUTO-ENTMCNC: 33.7 G/DL (ref 32–36)
MCV RBC AUTO: 90 FL (ref 80–100)
MONOCYTES # BLD AUTO: 0.65 X10*3/UL (ref 0.1–1)
MONOCYTES NFR BLD AUTO: 8.5 %
NEUTROPHILS # BLD AUTO: 3.98 X10*3/UL (ref 1.2–7.7)
NEUTROPHILS NFR BLD AUTO: 51.7 %
NRBC BLD-RTO: 0 /100 WBCS (ref 0–0)
PERFORMED ON: NORMAL
PLATELET # BLD AUTO: 172 X10*3/UL (ref 150–450)
POC CREATININE: 0.9 MG/DL (ref 0.8–1.3)
POC SAMPLE TYPE: NORMAL
POTASSIUM SERPL-SCNC: 3.9 MMOL/L (ref 3.5–5.3)
PROT SERPL-MCNC: 6.4 G/DL (ref 6.4–8.2)
RBC # BLD AUTO: 4.46 X10*6/UL (ref 4.5–5.9)
SODIUM SERPL-SCNC: 139 MMOL/L (ref 136–145)
WBC # BLD AUTO: 7.7 X10*3/UL (ref 4.4–11.3)

## 2024-05-24 PROCEDURE — 84075 ASSAY ALKALINE PHOSPHATASE: CPT | Performed by: STUDENT IN AN ORGANIZED HEALTH CARE EDUCATION/TRAINING PROGRAM

## 2024-05-24 PROCEDURE — 93971 EXTREMITY STUDY: CPT | Mod: FOREIGN READ | Performed by: RADIOLOGY

## 2024-05-24 PROCEDURE — 96375 TX/PRO/DX INJ NEW DRUG ADDON: CPT

## 2024-05-24 PROCEDURE — 85025 COMPLETE CBC W/AUTO DIFF WBC: CPT | Performed by: STUDENT IN AN ORGANIZED HEALTH CARE EDUCATION/TRAINING PROGRAM

## 2024-05-24 PROCEDURE — 96374 THER/PROPH/DIAG INJ IV PUSH: CPT

## 2024-05-24 PROCEDURE — 96376 TX/PRO/DX INJ SAME DRUG ADON: CPT

## 2024-05-24 PROCEDURE — 36415 COLL VENOUS BLD VENIPUNCTURE: CPT | Performed by: STUDENT IN AN ORGANIZED HEALTH CARE EDUCATION/TRAINING PROGRAM

## 2024-05-24 PROCEDURE — 2500000004 HC RX 250 GENERAL PHARMACY W/ HCPCS (ALT 636 FOR OP/ED): Performed by: STUDENT IN AN ORGANIZED HEALTH CARE EDUCATION/TRAINING PROGRAM

## 2024-05-24 PROCEDURE — 93970 EXTREMITY STUDY: CPT

## 2024-05-24 PROCEDURE — 99284 EMERGENCY DEPT VISIT MOD MDM: CPT | Mod: 25

## 2024-05-24 RX ORDER — MORPHINE SULFATE 4 MG/ML
4 INJECTION, SOLUTION INTRAMUSCULAR; INTRAVENOUS ONCE
Status: COMPLETED | OUTPATIENT
Start: 2024-05-24 | End: 2024-05-24

## 2024-05-24 RX ORDER — OXYCODONE HYDROCHLORIDE 5 MG/1
5 TABLET ORAL EVERY 6 HOURS PRN
Qty: 12 TABLET | Refills: 0 | Status: SHIPPED | OUTPATIENT
Start: 2024-05-24 | End: 2024-05-27

## 2024-05-24 RX ORDER — ONDANSETRON HYDROCHLORIDE 2 MG/ML
4 INJECTION, SOLUTION INTRAVENOUS ONCE
Status: COMPLETED | OUTPATIENT
Start: 2024-05-24 | End: 2024-05-24

## 2024-05-24 RX ORDER — NALOXONE HYDROCHLORIDE 4 MG/.1ML
4 SPRAY NASAL AS NEEDED
Qty: 1 EACH | Refills: 0 | Status: SHIPPED | OUTPATIENT
Start: 2024-05-24

## 2024-05-24 RX ADMIN — MORPHINE SULFATE 4 MG: 4 INJECTION, SOLUTION INTRAMUSCULAR; INTRAVENOUS at 18:54

## 2024-05-24 RX ADMIN — MORPHINE SULFATE 4 MG: 4 INJECTION, SOLUTION INTRAMUSCULAR; INTRAVENOUS at 18:06

## 2024-05-24 RX ADMIN — ONDANSETRON 4 MG: 2 INJECTION INTRAMUSCULAR; INTRAVENOUS at 18:06

## 2024-05-24 ASSESSMENT — PAIN SCALES - GENERAL
PAINLEVEL_OUTOF10: 8
PAINLEVEL_OUTOF10: 10 - WORST POSSIBLE PAIN
PAINLEVEL_OUTOF10: 7
PAINLEVEL_OUTOF10: 0 - NO PAIN

## 2024-05-24 ASSESSMENT — PAIN DESCRIPTION - LOCATION: LOCATION: BACK

## 2024-05-24 ASSESSMENT — LIFESTYLE VARIABLES
TOTAL SCORE: 0
EVER FELT BAD OR GUILTY ABOUT YOUR DRINKING: NO
EVER HAD A DRINK FIRST THING IN THE MORNING TO STEADY YOUR NERVES TO GET RID OF A HANGOVER: NO
HAVE YOU EVER FELT YOU SHOULD CUT DOWN ON YOUR DRINKING: NO
HAVE PEOPLE ANNOYED YOU BY CRITICIZING YOUR DRINKING: NO

## 2024-05-24 ASSESSMENT — PAIN - FUNCTIONAL ASSESSMENT
PAIN_FUNCTIONAL_ASSESSMENT: 0-10
PAIN_FUNCTIONAL_ASSESSMENT: 0-10

## 2024-05-24 ASSESSMENT — PAIN DESCRIPTION - ORIENTATION: ORIENTATION: MID;LOWER

## 2024-05-24 ASSESSMENT — PAIN DESCRIPTION - FREQUENCY: FREQUENCY: CONSTANT/CONTINUOUS

## 2024-05-24 ASSESSMENT — PAIN DESCRIPTION - PAIN TYPE: TYPE: ACUTE PAIN

## 2024-05-24 ASSESSMENT — PAIN DESCRIPTION - DESCRIPTORS
DESCRIPTORS: BURNING
DESCRIPTORS: BURNING;ACHING

## 2024-05-24 NOTE — ED PROVIDER NOTES
"HPI   Chief Complaint   Patient presents with    Back Pain     Post op back pain. Surgery for pinched nerve in lumbar region on Wednesday. Pt states, \"pain is unbearable\".        Patient is a 39-year-old male with history of left-sided sciatica, hypertension, PTSD who presents for postop pain.  Patient had a L5-S1 left-sided microdiscectomy on May 22.  States his pain started increasing last night into this morning.  States he last took a Percocet, 5 mg/325, at 1245.  States he does get some relief from it for short period of time however his pain is back and significantly worse.  No fevers, chills, vomiting, diarrhea, saddle anesthesia, urinary tension, bowel control problems.  States he did notice his bilateral legs are swollen starting yesterday or today.  No history of DVT or PE.  Patient is having bowel movements and passing flatus.  No pain in his leg, pain is in his left low back.  No bleeding or pus from the surgical site that he has noticed.                          Phill Coma Scale Score: 15                     Patient History   Past Medical History:   Diagnosis Date    Hypertension     PTSD (post-traumatic stress disorder)      Past Surgical History:   Procedure Laterality Date    BACK SURGERY      HAND SURGERY      HERNIA REPAIR       No family history on file.  Social History     Tobacco Use    Smoking status: Former     Types: Cigarettes    Smokeless tobacco: Never   Vaping Use    Vaping status: Never Used   Substance Use Topics    Alcohol use: Not Currently    Drug use: Never       Physical Exam   ED Triage Vitals [05/24/24 1622]   Temperature Heart Rate Respirations BP   37.1 °C (98.8 °F) 90 18 151/76      Pulse Ox Temp Source Heart Rate Source Patient Position   97 % Temporal -- --      BP Location FiO2 (%)     -- --       Physical Exam  Constitutional:       General: He is not in acute distress.  HENT:      Head: Normocephalic.   Eyes:      Extraocular Movements: Extraocular movements intact.     "  Conjunctiva/sclera: Conjunctivae normal.      Pupils: Pupils are equal, round, and reactive to light.   Cardiovascular:      Rate and Rhythm: Normal rate and regular rhythm.      Pulses: Normal pulses.      Heart sounds: Normal heart sounds.   Pulmonary:      Effort: Pulmonary effort is normal.      Breath sounds: Normal breath sounds.   Abdominal:      General: There is no distension.      Palpations: Abdomen is soft. There is no mass.      Tenderness: There is no abdominal tenderness. There is no guarding.   Musculoskeletal:         General: No deformity.      Cervical back: Normal range of motion and neck supple.      Right lower leg: Edema (1+) present.      Left lower leg: Edema (1+) present.      Comments: Approximately 3 cm surgical wound over low mid back, no erythema/bleeding/pus.  No necrotic tissue.  No crepitus.   Skin:     General: Skin is warm and dry.      Findings: No lesion or rash.   Neurological:      General: No focal deficit present.      Mental Status: He is alert and oriented to person, place, and time. Mental status is at baseline.      Cranial Nerves: No cranial nerve deficit.      Sensory: No sensory deficit.      Motor: No weakness.   Psychiatric:         Mood and Affect: Mood normal.         ED Course & MDM   Diagnoses as of 05/24/24 2010   Acute left-sided low back pain without sciatica       Medical Decision Making  Patient is a 39-year-old male with above-stated past medical history who presents for back pain.  Patient is no red flag symptoms on his history or physical exam to suggest spinal epidural abscess, cauda equina, cord compression.  He has no fever or leukocytosis and has no IV drug use history that he endorses.  I do not believe he requires an emergent MRI.  Patient did endorse leg swelling and an ultrasound performed which was negative for DVT.  I did advise him on his symptoms and that if it did not improve in the next several days he may need a repeat scan to further rule  out a DVT.  I advised him on strict return precautions regarding chest pain or shortness of breath or any other concerning symptoms, he stated understanding agreement.  Patient's clinical well-appearing nontoxic.  He is able to ambulate steadily even before pain medications provided.  Patient did have improvement in his pain with morphine and feels comfortable turning home.  I believe this is reasonable.  I believe patient's pain is likely postoperative.  He was given a short course of oxycodone and I advised him on alternating this with his Percocet.  He was also given a prescription for Narcan and was advised to fill this and not to take additional doses of oxycodone without someone there to observe him safely.  He stated his uncle will be around.  I advised his uncle on using Narcan if the patient became drowsy or had any change in mental status.  He stated understanding agreement.  They were advised to call 911 if the patient required Narcan.  Patient was instructed to follow-up with his orthopedic surgeon on Tuesday.  Patient was discharged in stable condition.    Disclaimer: This note was dictated using speech recognition software. Minor errors in transcription may be present. Please call if questions.     Osmani Puri MD  [facility] Emergency Medicine  Contact on [employer]        Problems Addressed:  Acute left-sided low back pain without sciatica: acute illness or injury    Amount and/or Complexity of Data Reviewed  Labs: ordered.  Radiology: ordered.        Procedure  Procedures     Osmani Puri MD  05/24/24 2010

## 2024-05-24 NOTE — ED TRIAGE NOTES
Had recent back surgery 36 hrs ago now having bilateral leg swelling and extreme pain in lower back and bilateral legs. Hasn't taken pain medication since 1 pm

## 2024-05-24 NOTE — ED PROVIDER NOTES
[facility] ED  eMERGENCYdEPARTMENT eNCOUnter        Pt Name: Mike Leiva  MRN: [id number]  Birthdate 1985of evaluation: 5/23/2024  Provider:Ty Asencio PA-C  9:28 PM EDT    CHIEF COMPLAINT       Chief Complaint   Patient presents with    Post-op Problem         HISTORY OF PRESENT ILLNESS  (Location/Symptom, Timing/Onset, Context/Setting, Quality, Duration, Modifying Factors, Severity.)   Mike Leiva is a 39 y.o. male who presents to the emergency department with complaints of severe pain to the lumbar back region as well as some lower extremity edema bilaterally following a discectomy done 36 hours prior.  Patient states that he was sent home with Percocet and Motrin which she has been taking however he states a severe amount of pain continues.  Patient denies any fevers, saddle paresthesias or loss of bowel or bladder control, and actually states that he now has sensation to his legs which he did not have prior to surgery.    HPI    Nursing Notes were reviewed and I agree.    REVIEW OF SYSTEMS    (2-9 systems for level 4, 10 or more for level 5)     Review of Systems   Constitutional:  Negative for chills and fever.   Respiratory:  Negative for chest tightness and shortness of breath.    Cardiovascular:  Positive for leg swelling.   Musculoskeletal:  Positive for back pain.        as noted above the remainder of the review of systems was reviewed and negative.       PAST MEDICAL HISTORY     Past Medical History:   Diagnosis Date    Anxiety and depression 4/3/2024    Epididymitis     EtOH dependence (HCC)     Hypertension          SURGICAL HISTORY       Past Surgical History:   Procedure Laterality Date    CARPAL TUNNEL RELEASE Bilateral 2020    HERNIA REPAIR  2003    LUMBAR SPINE SURGERY N/A 5/22/2024    LUMBAR SPINE L5-S1 LEFT MICRODISCECTOMY PRONE, AXIS TABLE, NEW MICROSCOPE, NEW C-ARM, LUMBAR BRACE, GLOBUS EQUIPMENT MIS TUBES-e-mailed Berto mendez performed by Deni Hernández MD at

## 2024-05-29 ENCOUNTER — OFFICE VISIT (OUTPATIENT)
Dept: ORTHOPEDIC SURGERY | Facility: CLINIC | Age: 39
End: 2024-05-29
Payer: MEDICAID

## 2024-05-29 DIAGNOSIS — M54.16 LUMBAR RADICULOPATHY: Primary | ICD-10-CM

## 2024-05-29 PROCEDURE — 99024 POSTOP FOLLOW-UP VISIT: CPT | Performed by: PHYSICIAN ASSISTANT

## 2024-05-29 RX ORDER — CYCLOBENZAPRINE HCL 10 MG
10 TABLET ORAL 3 TIMES DAILY PRN
Qty: 30 TABLET | Refills: 0 | Status: SHIPPED | OUTPATIENT
Start: 2024-05-29 | End: 2024-06-08

## 2024-05-29 RX ORDER — METHYLPREDNISOLONE 4 MG/1
TABLET ORAL
Qty: 1 EACH | Refills: 0 | Status: SHIPPED | OUTPATIENT
Start: 2024-05-29

## 2024-05-29 NOTE — PROGRESS NOTES
Established patient here is postop 1 week after a microdisc.  He had some issues with some pain so he came in for evaluation.  He denies fever chills nausea vomiting night sweats.  Denies bowel or bladder complaints, denies saddle anesthesia.  Denies fever chills nausea vomiting night sweats.  The pain he had before surgery radicular pain and sensory issues you are having is much better.  He just got some incisional back pain.  And some spasms noted    Physical exam: Well-nourished, well kept.  No lymphangitis or lymphadenopathy in the examined extremities.  Good perfusion to the extremities ×4.  Radial and dorsalis pedis pulses 2+.  Capillary refill to all 4 digits brisk.  No distal edema x 4.  Gait normal.  Can walk on heels and toes.  There is decreased range of motion flexion-extension rotation lumbar spine tender lumbar spinal musculature good strength no instability all wounds are clean dry and intact no signs of any redness swelling or infection.  Gross sensation intact to the lower extremity is bilaterally.  Affect normal.  Examination of the lower extremities reveals no point tenderness, swelling, or deformity.  Range of motion of the hips, knees, and ankles are full without crepitance, instability, or exacerbation of pain.  Strength is 5/5 throughout.    Assessment: This a patient with some swelling and some spasming which is told we can treat with some medication.    Plan: We will try little muscle relaxant.  Get him in a pain management referral if he needs more pain meds.  He will follow-up next week on his scheduled visit

## 2024-05-29 NOTE — TELEPHONE ENCOUNTER
Pt requesting tramadol refill to [facility]. Lov 04/10/24, no fuv. Current csa, needs uds Pt calling about med refill Pt calling about refill again

## 2024-06-04 ENCOUNTER — OFFICE VISIT (OUTPATIENT)
Dept: ORTHOPEDIC SURGERY | Facility: CLINIC | Age: 39
End: 2024-06-04
Payer: MEDICAID

## 2024-06-04 ENCOUNTER — OFFICE VISIT (OUTPATIENT)
Dept: FAMILY MEDICINE CLINIC | Age: 39
End: 2024-06-04
Payer: COMMERCIAL

## 2024-06-04 VITALS
BODY MASS INDEX: 30.07 KG/M2 | TEMPERATURE: 98 F | DIASTOLIC BLOOD PRESSURE: 68 MMHG | HEIGHT: 72 IN | OXYGEN SATURATION: 99 % | HEART RATE: 86 BPM | WEIGHT: 222 LBS | SYSTOLIC BLOOD PRESSURE: 132 MMHG

## 2024-06-04 DIAGNOSIS — M54.16 LUMBAR RADICULOPATHY: Primary | ICD-10-CM

## 2024-06-04 DIAGNOSIS — G89.18 ACUTE POST-OPERATIVE PAIN: ICD-10-CM

## 2024-06-04 DIAGNOSIS — M54.50 LUMBAR SPINE PAIN: Primary | ICD-10-CM

## 2024-06-04 PROCEDURE — 3075F SYST BP GE 130 - 139MM HG: CPT | Performed by: NURSE PRACTITIONER

## 2024-06-04 PROCEDURE — 99024 POSTOP FOLLOW-UP VISIT: CPT | Performed by: PHYSICIAN ASSISTANT

## 2024-06-04 PROCEDURE — 99214 OFFICE O/P EST MOD 30 MIN: CPT | Performed by: NURSE PRACTITIONER

## 2024-06-04 PROCEDURE — 3078F DIAST BP <80 MM HG: CPT | Performed by: NURSE PRACTITIONER

## 2024-06-04 ASSESSMENT — ENCOUNTER SYMPTOMS
SHORTNESS OF BREATH: 0
CHEST TIGHTNESS: 0

## 2024-06-04 NOTE — PROGRESS NOTES
Josh Schaeffer is a 39 y.o. male who presents for Follow-up of the Lower Back (5/22/24 L5-S1 Lt Microdisc 2 weeks out).    HPI:  39-year-old gentleman here for 2-week postop visit.  He is 2 weeks out from a left L5-S1 microdisc.  He denies any fever chills nausea vomiting night sweats.  He has no bowel or bladder complaints.    Physical exam:  Well-nourished, well kept.No lymphangitis or lymphadenopathy in the examined extremities.  Gait normal.  Can stand on heels and toes.   Examination of the back shows some tenderness in the paraspinous musculature.  There is no significant decreased range of motion in all directions due to guarding/muscle spasms and pain at extremes.  There is good strength and no instability.  Examination of the lower extremities reveals no point tenderness, swelling, or deformity.  Range of motion of the hips, knees, and ankles are full without crepitance, instability, or exacerbation of pain.  Strength is 5/5 throughout.  No redness, abrasions, or lesions on extremities  Gross sensation intact in the extremities. Affect normal.  Alert and oriented ×3.  Coordination normal.  The incision is very well-healed.  Steri-Strips are intact suture tags were cut    Assessment:  39-year-old gentleman here for 2-week postop.  He is 2 weeks out from a left L5-S1 microdiscectomy.  Overall the pain that he was having prior to surgery is gone, he noticed when he woke up from the operation that he felt better.  He still having a little bit of stiffness and achiness in the back and some cramping in the back of the left leg otherwise he is progressing well.  He is following all postop restrictions he is wearing his brace as instructed.    Plan:  He still a little stiff and sore, but he is following postop restrictions and he does notice significant improvement since the surgery.  Will have him continue with postoperative restrictions and I will see him back in about 1 month because he is asking about physical  therapy or aqua therapy.  If he is doing well in a month and would still like to do therapy we can get him in at that time.    Riki Mcneill PA-C

## 2024-06-04 NOTE — PROGRESS NOTES
[facility]  [facility]  [address] [place] [address]  Dept: [phone]  Dept Fax: [phone]  Loc: [phone]                             Hospital Follow Up:      Mike Leiva   YOB: 1985    Date of Office Visit:  6/4/2024  Date of Hospital Admission: 5/23/24  Date of Hospital Discharge: 5/23/24      Patient Active Problem List   Diagnosis    Hypertension    PTSD (post-traumatic stress disorder)    Lumbar spine pain    Anxiety and depression       No Known Allergies    Medications marked \"taking\" at this time  Outpatient Medications Marked as Taking for the 6/4/24 encounter (Office Visit) with Sarah Lucas APRN - CNP   Medication Sig Dispense Refill    prazosin (MINIPRESS) 5 MG capsule Take 1 capsule by mouth nightly      hydroCHLOROthiazide (MICROZIDE) 12.5 MG capsule Take 1 capsule by mouth daily            Chief Complaint   Patient presents with    Follow-up    Referral     Pt needs referral for pain management.  Would like note for PT aquatics.       HPI    ER 23rd and 24th was in ER for post op pain. 5/24 patient was sent in oxycodone for 3 days.     Surgeon:  Deni Israel, surgery was 5/22:    Cleared pending labs and pre admission testing 5/15.      Type of Surgery:   LUMBAR SPINE L5-S1 LEFT MICRODISCECTOMY PRONE    Inpatient course: Discharge summary reviewed- Copied below:    Patient is a 39-year-old male with above-stated past medical history who presents for back pain. Patient is no red flag symptoms on his history or physical exam to suggest spinal epidural abscess, cauda equina, cord compression. He has no fever or leukocytosis and has no IV drug use history that he endorses. I do not believe he requires an emergent MRI. Patient did endorse leg swelling and an ultrasound performed

## 2024-06-14 ENCOUNTER — OFFICE VISIT (OUTPATIENT)
Dept: PAIN MEDICINE | Facility: CLINIC | Age: 39
End: 2024-06-14
Payer: MEDICAID

## 2024-06-14 VITALS
OXYGEN SATURATION: 98 % | SYSTOLIC BLOOD PRESSURE: 138 MMHG | TEMPERATURE: 97.7 F | RESPIRATION RATE: 18 BRPM | HEART RATE: 78 BPM | DIASTOLIC BLOOD PRESSURE: 88 MMHG

## 2024-06-14 DIAGNOSIS — M47.816 LUMBAR SPONDYLOSIS: Primary | ICD-10-CM

## 2024-06-14 DIAGNOSIS — M54.16 LUMBAR RADICULOPATHY: ICD-10-CM

## 2024-06-14 PROCEDURE — 99214 OFFICE O/P EST MOD 30 MIN: CPT | Performed by: PAIN MEDICINE

## 2024-06-14 ASSESSMENT — COLUMBIA-SUICIDE SEVERITY RATING SCALE - C-SSRS
2. HAVE YOU ACTUALLY HAD ANY THOUGHTS OF KILLING YOURSELF?: NO
6. HAVE YOU EVER DONE ANYTHING, STARTED TO DO ANYTHING, OR PREPARED TO DO ANYTHING TO END YOUR LIFE?: NO
1. IN THE PAST MONTH, HAVE YOU WISHED YOU WERE DEAD OR WISHED YOU COULD GO TO SLEEP AND NOT WAKE UP?: NO

## 2024-06-14 ASSESSMENT — PAIN SCALES - GENERAL
PAINLEVEL: 6
PAINLEVEL_OUTOF10: 6

## 2024-06-14 ASSESSMENT — PAIN DESCRIPTION - DESCRIPTORS: DESCRIPTORS: ACHING;DULL;DISCOMFORT

## 2024-06-14 ASSESSMENT — PAIN - FUNCTIONAL ASSESSMENT: PAIN_FUNCTIONAL_ASSESSMENT: 0-10

## 2024-06-14 NOTE — H&P
History Of Present Illness  Josh Schaeffer is a 39 y.o. male presenting with chronic back pain that was radiating down to the left lower extremity the patient was operated on by Dr. Merritt in May 2024 he improved with the symptoms radiating down to the left lower extremity but he continues to describe pain in the lumbar spine area radiating currently towards the left hip.  Pain is aggravated by sitting or any type of bending.  Rating the pain at a level of 7 out of 10 described as a shooting burning sensation.  Was tried on cyclobenzaprine etodolac and tizanidine currently he is on baclofen confirming no significant improvement.  Was also given a dose of methylprednisolone but he did not feel any significant improved  He was seen and evaluated by Dr. Merritt recently and he was referred for physical therapy but it was not started as yet.  He is currently under the care of Dr. Villegas and taking Percocet 5/325 3 pills/day.  Past Medical History  No known medical history  Surgical History  Discotomy   Social History  He reports that he has never smoked. He has never used smokeless tobacco. No history on file for alcohol use and drug use.    Family History  No family history on file.     Allergies  No Known Allergies  Review of Systems   12 Systems have been reviewed as follows.   Constitutional: Fever, weight gain, weight loss, appetite change, night sweats, fatigue, chills.  Eyes : blurry, double vision, vision, loss, tearing, redness, pain, sensitivity to light, glaucoma.  Ears, nose, mouth, and throat: Hearing loss, ringing in the ears, ear pain, nasal congestion, nasal drainage, nosebleeds, mouth, throat, irritation tooth problem.  Cardiovascular :chest pain, pressure, heart tracing,palpaitations , sweating, leg swelling, high or low blood pressure  Pulmonary: Cough, yellow or green sputum, blood and sputum, shortness of breath, wheezing  Gastrointestinal: Nause, vomiting, diarrhea, constipation, pain, blood in  stool, or vomitus, heartburn, difficulty swallowing  Genitourinary: incontinence, abnormal bleeding, abnormal discharge, urinary frequency, urinary hesitancy, pain, impotence sexual problem, infection, urinary retention  Musculoskeletal: Pain, stiffness, joint, redness or warmth, arthritis, back pain, weakness, muscle wasting, sprain or fracture  Neuro: Weight weakness, dizziness, change in voice, change in taste change in vision, change in hearing, loss, or change of sensation, trouble walking, balance problems coordination problems, shaking, speech problem  Endocrine , cold or heat intolerance, blood sugar problem, weight gain or loss missed periods hot flashes, sweats, change in body hair, change in libido, increased thirst, increased urination  Heme/lymph: Swelling, bleeding, problem anemia, bruising, enlarged lymph nodes  Allergic/immunologic: H. plus nasal drip, watery itchy eyes, nasal drainage, immunosuppressed  The above, were reviewed and noted negative except as noted.  Positive testicular pain   Physical Exam   Vital signs reviewed, documented in chart     General:  Appears well, does not look in any major distress  Alert    HEENT:  Head atraumatic  Eyes normal inspection  PERRL  Normal ENT inspection  No signs of dehydration    NECK:  Normal inspection  Range of motion within normal     RESPIRATORY:  No respiratory distress    CVS:  Heart rate and rhythm regular    ABDOMEN/GI  Soft  Non-tender  No distention  No organomegaly      BACK:  Normal inspection, flexion and extension within normal limit   no tenderness upon the palpation of the facet joint  Si joints none tender to palpations     EXTREMITIES:  Non-Tender  Full ROM  Normal appearance  No Pedal edema  Power symmetrical , sensory examination preserved.    NEURO:  Alert and oriented X 3  CNS normal as tested without focal neurological deficit   Sensation normal  Motor normal  reflexes normal    PSYCH:  Mood normal  Affect normal    SKIN:  Color  normal  No rash  Warm  Dry  no sign of skin marking supportive of IV drug usage /abuse.    Last Recorded Vitals  Blood pressure 138/88, pulse 78, temperature 36.5 °C (97.7 °F), resp. rate 18, SpO2 98%.  MR lumbar spine wo IV contrast    Result Date: 3/19/2024  Interpreted By:  Lul Easton, STUDY: MR LUMBAR SPINE WO IV CONTRAST; ;  3/18/2024 4:25 pm   INDICATION: Signs/Symptoms:back and left leg.   COMPARISON: None.   ACCESSION NUMBER(S): [id number]   ORDERING CLINICIAN: TOSHIA HOLDER   TECHNIQUE: Multiplanar and multisequential MR images of the lumbar spine are performed.   FINDINGS: The lumbar vertebral body alignment is within normal limits. There is disc desiccation and disc space narrowing at L5-S1. There is mild disc desiccation at L1-2. The lumbar vertebral body heights are maintained. There is no acute bone marrow edema.   The conus medullaris is of normal morphology and signal. The tip of the conus descends to the L1 level.   Axial images are performed through the lumbar disc spaces from the inferior endplate of T12 through S1.   The T12-L1 disc space level is unremarkable.   The L1-2 disc space level is unremarkable.   The L2-3 disc space level is unremarkable.   The L3-4 disc space level is unremarkable.   The L4-5 disc space level demonstrates minimal bulging disc with slight disc encroachment on the caudal neural foramen. There is no significant central canal or neural foraminal stenosis.   The L5-S1 disc space level demonstrates broad-based disc protrusion on the left with tiny posterior annular tear. Disc material may contact the left S1 nerve root. There is no mass effect upon the thecal sac or right S1 nerve root. There is minimal disc encroachment on the caudal neural foramen.       Discogenic degenerative changes at L5-S1 with broad-based disc protrusion to the left of midline. Disc material may contact the left S1 nerve root.   The remainder of the lumbar spine is unremarkable.      MACRO: None   Signed by: Lul Easton 3/19/2024 10:24 AM Dictation workstation:   [id number]    XR lumbar spine 2-3 views    Result Date: 2/6/2024  Interpreted By:  Austin Estevez, STUDY: XR LUMBAR SPINE 2-3 VIEWS;  ;  2/6/2024 9:49 am   INDICATION: Signs/Symptoms:back pain.   ACCESSION NUMBER(S): [id number]   ORDERING CLINICIAN: AUSTIN ESTEVEZ   FINDINGS: AP lateral x-rays of the lumbar spine shows minimal degenerative changes with some endplate irregularity. There is no spondylolisthesis. There is no spondylolysis. There is no fractures. Lumbar lordosis is maintained. There is no scoliosis. Pedicles are visualized at all levels. Bony pelvis and hips are partially visualized and are normal.     Signed by: Austin Estevez 2/6/2024 10:26 AM Dictation workstation:   [id number]     Assessment/Plan   39 years old with history and physical examination supportive of chronic back pain status post discectomy with persistent back pain    Plan  Advised the patient about the different modalities available for the treatment of his condition I recommended for the patient to initiate physical therapy targeting the lower lumbar spine area if the patient fails the physical therapy and the nonsteroidal anti-inflammatory at that time we will consider him for diagnostic medial nerve branch block to be performed bilaterally at the level of the L4-L5 L5-S1 to be performed under fluoroscopic guidance to confirm needle placement if the patient described positive response at that time could consider him for denervation of the medial nerve branches L4-L5 L5-S1 bilaterally benefits and risk were discussed with the patient  I discouraged the patient from having to pain physician and I explained to the patient that if he decided to continue his care through me I would not recommend continuation of the opiate therapy due to the risk associated with the continuation of the opiate therapy for extended duration of time the  patient is welcome to follow-up with the pain clinic on as-needed basis      The above clinical summary has been dictated with voice recognition software. It has not been proofread for grammatical errors, typographical mistakes, or other semantic inconsistencies.    Thank you for visiting our office today. It was our pleasure to take part in your healthcare.     Please do not hesitate to contact the pain clinic after your visit with any questions or concerns at  M-F 8-4 pm       Nicolle Angelo M.D.  Medical Director , Division of Pain Medicine [facility]   of Anesthesiology and Pain Medicine  [facility] [employer]     [place] [address] [address] [place] [address]     Office: [phone]  Fax: [phone]      Nicolle Angelo MD

## 2024-07-01 PROBLEM — N50.812 PAIN IN LEFT TESTICLE: Status: ACTIVE | Noted: 2024-07-01

## 2024-07-02 NOTE — PROGRESS NOTES
Physical Therapy    Physical Therapy Evaluation and Treatment      Patient Name: Josh Schaeffer  MRN: [id number]  Today's Date: 7/3/2024  Time Calculation  Start Time: 0915  Stop Time: 1000  Time Calculation (min): 45 min    Assessment:    The patient presents to Physical Therapy with signs and symptoms consistent with the diagnosis of recovering microdiscectomy. Key impairments include: back stiffness, LE weakness, muscle tightness in quad and hip flexor, and difficulty with functional activities such as working in kitchen, returning to gym routine.    Standardized testing and measures administered today, including ROM, strength, joint mobility testing, and observation which reveal that the patient has multiple impairments in body structure and functions, activity limitations, and participation restrictions. The patient has personal factors and comorbidities that may serve as barriers affecting plan of care. Today's findings indicate that the patient is of low complexity and would benefit from skilled PT to make measurable and meaningful changes in the above outcome measures and achieve improvements in the patient's functional status and individual goals. The patient verbalized understanding and is in agreement with all goals and plan of care.      Plan:   Pt will be seen 1-2x/week for 10 visits. Potential to achieve rehab goals is good. Plan of care was developed with input and agreement by the patient.    Treatment may include: Therapeutic Exercise (PROM, AA/AROM, Flexibility, Strengthening, Stabilization, HEP instruction). Therapeutic Activities (Transfers, Body Mechanics, Work Related Activities, Closed Chain, Agility and Power). Manual Techniques (Soft tissue Mobilization, Joint Mobilization/Distraction, Muscle Energy Techniques, Lymphatic Drainage, Dry Needling). Neuromuscular Reeducation (Postural Training, Balance/Proprioception, Relaxation Techniques). Biofeedback. Aquatic Exercise. Modalities: Ultrasound,  "Cryotherapy, Vasopneumatic with or without Cryotherapy. Electrical Stimulation (TENS/IFC/ Premodulated for pain relief, NMES for Muscle reeducation). Gait Training. Orthotic Fit and Training. Strapping, Kinesiotaping.       Current Problem:   1. Back stiffness  Follow Up In Physical Therapy      2. Lumbar spondylosis  Referral to Physical Therapy    Follow Up In Physical Therapy      3. Back pain  Follow Up In Physical Therapy      4. Muscle weakness  Follow Up In Physical Therapy          Subjective    Pt is a 39  year old male with complaints of post op pain following 5/22/24 L5-S1 L Microdiscectomy. Resolved radiculopathy.     Aggravating factors/ Functional Limitations: bending to put shoes on.   Alleviating factors: Heated seats in the truck, brace  Pain descriptors: two weeks ago bumped his back on a handle in a warehouse and felt some pain. \"Shooting\" scar tissue pain.   Imaging: none since surgery   Home: chopping food for a couple hours was difficult/fatiguing.   Goals: gym, gymnastics running, reduce weight, improve ROM,     Pt denies night pain, unrelenting pain, unexplained weight loss 10-20lb in the last 4 weeks, loss of appetite, unusual lumps or growth, unusual fatigue.  Pt denies numbness/ tingling. Pt denies bowel or bladder changes.    Precautions:  Precautions  Precautions Comment: Pt fell within last 6 mo, pt was taking medication for PTSD and it is impacting his sleep, and stood too quickly with ears ringing and fell down.    Pain:  Pain Assessment  Pain Assessment: 0-10  0-10 (Numeric) Pain Score: 0 - No pain      Objective     Babinski's (-)  Clonus (-)    Reflexes:  Patellar: 1+/0  Achilles: 0/0    HIP AROM (degrees):  Hip Flexion R/L: > 120 addy, with restriction  Hip Internal Rotation R/L: > 30 addy  Hip External Rotation R/L:   45 addy  Hip Abduction R/L: > 45 addy    Flexibility (degrees):   Hamstring 90/90 position R/L:30/30    Strength Testing:  Hip Flexion R/L: 3+/3+  Knee extension R/L:  " 5/5  Knee Flexion R/L: 4+/4+  DF R/L: 5/5  Hip Abduction R/L:4-/4-  Hip Extension R/L:  4/4    Observation:  Incision well healed without sign of infection, no major restricting scar tissue formation.   Posture: slight APT      Outcome Measures:  Other Measures  5x Sit to Stand: 23.02s  Oswestry Disablity Index (LUIZ): 28%     Treatments:  Access Code: [email or web address]  URL: [email or web address]  Date: 07/03/2024  Prepared by: Seble Hodges    Exercises  - Hip Flexor Stretch at Edge of Bed  - 1 x daily - 7 x weekly - 3 sets - 30s hold  - Prone Quadriceps Stretch with Strap  - 1 x daily - 7 x weekly - 2 reps - 30s hold  Self release to iliopsoas - edu and demo    Sheet with exercise descriptions and images issued. Skilled intervention utilized in the appropriate selection & application of above exercises. Verbal and tactile cues provided for proper form and technique. Pt demonstrated appropriate form & verbalized understanding of optimal technique for above exercises.      Goals:    Patient will be able to perform 45 minutes of aquatic exercise with reported </= 2/10 pain level   Patient will ambulate up/ down pool ramp without UE assist or decrease UE assist .   Patient will display improved gait quality on pool deck demonstrating improved maría /step through pattern   Patient will ascend/ descend 8 inch step in 3 1/2 feet water with good control without UE assist   Patient will demonstrate dynamic lumbar stabilization (DLS) with good control, performing with UE involvement and paddle resistance at level 5. (level 5 is the hardest/ level 1 is the easiest)   Patient will perform 5x sit to stand from bench without the use of upper extremities </= 15 seconds to show improved lower extremity functional strength.  Patient will perform single leg stance (SLS) in 4 foot water depth without the use of upper extremity assist.   Patient will be able to enter/exit pool via pool ramp and will not require  assistance of chair lift to participate with aquatic session.  Patient will perform/recall >/= 85% of aquatic program without cueing.

## 2024-07-03 ENCOUNTER — EVALUATION (OUTPATIENT)
Dept: PHYSICAL THERAPY | Facility: CLINIC | Age: 39
End: 2024-07-03
Payer: MEDICAID

## 2024-07-03 DIAGNOSIS — M62.81 MUSCLE WEAKNESS: ICD-10-CM

## 2024-07-03 DIAGNOSIS — M25.69 BACK STIFFNESS: Primary | ICD-10-CM

## 2024-07-03 DIAGNOSIS — M47.816 LUMBAR SPONDYLOSIS: ICD-10-CM

## 2024-07-03 DIAGNOSIS — M54.9 BACK PAIN: ICD-10-CM

## 2024-07-03 PROCEDURE — 97161 PT EVAL LOW COMPLEX 20 MIN: CPT | Mod: GP

## 2024-07-03 PROCEDURE — 97110 THERAPEUTIC EXERCISES: CPT | Mod: GP

## 2024-07-03 ASSESSMENT — PAIN SCALES - GENERAL: PAINLEVEL_OUTOF10: 0 - NO PAIN

## 2024-07-03 ASSESSMENT — PAIN - FUNCTIONAL ASSESSMENT: PAIN_FUNCTIONAL_ASSESSMENT: 0-10

## 2024-07-05 ENCOUNTER — OFFICE VISIT (OUTPATIENT)
Dept: ORTHOPEDIC SURGERY | Facility: CLINIC | Age: 39
End: 2024-07-05
Payer: MEDICAID

## 2024-07-05 DIAGNOSIS — M54.16 LUMBAR RADICULOPATHY: Primary | ICD-10-CM

## 2024-07-05 PROCEDURE — 99213 OFFICE O/P EST LOW 20 MIN: CPT | Performed by: PHYSICIAN ASSISTANT

## 2024-07-05 NOTE — PROGRESS NOTES
Josh Schaeffer is a 39 y.o. male who presents for Follow-up of the Lower Back (5/22/24 L5-S1 Lt Microdisc ).    HPI:  39-year-old gentleman here for follow-up of low back pain.  He is 6 weeks out from a left L5-S1 microdiscectomy.  He denies any fever chills nausea vomiting night sweats.  He has no bowel or bladder complaints.    Physical exam:  Well-nourished, well kept.No lymphangitis or lymphadenopathy in the examined extremities.  Gait normal.  Can stand on heels and toes.   Examination of the back shows no tenderness in the paraspinous musculature.  There is no decreased range of motion in all directions due to guarding/muscle spasms and pain at extremes.  There is good strength and no instability.  Examination of the lower extremities reveals no point tenderness, swelling, or deformity.  Range of motion of the hips, knees, and ankles are full without crepitance, instability, or exacerbation of pain.  Strength is 5/5 throughout.  No redness, abrasions, or lesions on extremities  Gross sensation intact in the extremities.    Affect normal.  Alert and oriented ×3.  Coordination normal.    Assessment:  39-year-old gentleman here for follow-up visit.  He is 6 weeks out from a left L5-S1 microdiscectomy.  He is doing great today.  The pain that he was having prior to surgery is gone his leg has no pain.  He went to a physical therapy evaluation and would like to start some postoperative therapy.    Plan:  Will let him engage in activities as tolerated he can start physical therapy, follow the recommendations of the therapist and we will see him back as needed.    Riki Mcneill PA-C

## 2024-07-08 ENCOUNTER — TREATMENT (OUTPATIENT)
Dept: PHYSICAL THERAPY | Facility: CLINIC | Age: 39
End: 2024-07-08
Payer: MEDICAID

## 2024-07-08 DIAGNOSIS — M47.816 LUMBAR SPONDYLOSIS: ICD-10-CM

## 2024-07-08 DIAGNOSIS — M54.9 BACK PAIN: ICD-10-CM

## 2024-07-08 DIAGNOSIS — M62.81 MUSCLE WEAKNESS: ICD-10-CM

## 2024-07-08 DIAGNOSIS — M25.69 BACK STIFFNESS: ICD-10-CM

## 2024-07-08 PROCEDURE — 97113 AQUATIC THERAPY/EXERCISES: CPT | Mod: GP,CQ

## 2024-07-08 PROCEDURE — 97150 GROUP THERAPEUTIC PROCEDURES: CPT | Mod: GP,CJ,KX,CQ

## 2024-07-08 NOTE — PROGRESS NOTES
Patient Name: Josh Schaeffer  MRN: [id number]  Today's Date: 7/8/2024  Time Calculation  Start Time: 0115  Stop Time: 0145  Time Calculation (min): 30 min    Subjective:  Reports that he no longer has radicular pain since surgery and is pleased with outcomes this far. Voiced concern on what limitations he does still have with returning to regular fitness routine and has been cautious  with general movements due to recently just weaned out of lumbar brace.     Objective:  Educated and instructed in proper lifting techniques with retrieving  objects off of floor.   Instructed in initial skilled aquatic therapeutic exercise. 15 minutes late for session     Assessment:   Pt requires cues for avoidance of lumbar compensation with movements. Decreased L LE motor control noted which required decreased range to avoid compensation. No overall increased pain reported with session.     Plan:   Ask response and progress trunk stabilization .    Treatment :   AQUATIC THERAPEUTIC EXERCISE 32982  15 minutes 1 unit 1:15-1:30   GROUP THERAPEUTIC PROCEDURE  01169 10 minutes 1 unit 1:35-1:45   (5 Minutes independent ) 1:30-1:35    Aquatic gait forward/Bwd/Lateral 3x each   March 3x   HS/GS Stretches (GS only today ) 2x30  Hip 3 way x15 (flex/abduction only today)   Squats x20  DLS 3 way NV   Trunk Rotation with noodle NV   Noodle Pull down x20  DB white and black  SL abduction/flex x15 each   SKTC 2x30        Current Problem:  1. Lumbar spondylosis  Follow Up In Physical Therapy      2. Back stiffness  Follow Up In Physical Therapy      3. Back pain  Follow Up In Physical Therapy      4. Muscle weakness  Follow Up In Physical Therapy              Pain:  4/10   Location: [facility]

## 2024-07-09 PROBLEM — H90.3 SENSORINEURAL HEARING LOSS, BILATERAL: Status: ACTIVE | Noted: 2023-10-04

## 2024-07-09 PROBLEM — H93.13 TINNITUS, BILATERAL: Status: ACTIVE | Noted: 2023-10-04

## 2024-07-09 RX ORDER — OXYCODONE HYDROCHLORIDE 5 MG/1
TABLET ORAL
COMMUNITY
Start: 2024-05-24

## 2024-07-09 RX ORDER — BACLOFEN 20 MG/1
1 TABLET ORAL
COMMUNITY
Start: 2024-07-01

## 2024-07-09 RX ORDER — OXYCODONE AND ACETAMINOPHEN 5; 325 MG/1; MG/1
1 TABLET ORAL 2 TIMES DAILY PRN
COMMUNITY
Start: 2024-07-03

## 2024-07-11 ENCOUNTER — TREATMENT (OUTPATIENT)
Dept: PHYSICAL THERAPY | Facility: CLINIC | Age: 39
End: 2024-07-11
Payer: MEDICAID

## 2024-07-11 DIAGNOSIS — M54.9 BACK PAIN: ICD-10-CM

## 2024-07-11 DIAGNOSIS — M62.81 MUSCLE WEAKNESS: ICD-10-CM

## 2024-07-11 DIAGNOSIS — M25.69 BACK STIFFNESS: ICD-10-CM

## 2024-07-11 DIAGNOSIS — M47.816 LUMBAR SPONDYLOSIS: ICD-10-CM

## 2024-07-11 PROCEDURE — 97113 AQUATIC THERAPY/EXERCISES: CPT | Mod: CQ,GP

## 2024-07-11 ASSESSMENT — PAIN SCALES - GENERAL: PAINLEVEL_OUTOF10: 4

## 2024-07-11 ASSESSMENT — PAIN DESCRIPTION - DESCRIPTORS: DESCRIPTORS: DISCOMFORT

## 2024-07-11 ASSESSMENT — PAIN - FUNCTIONAL ASSESSMENT: PAIN_FUNCTIONAL_ASSESSMENT: 0-10

## 2024-07-11 NOTE — PROGRESS NOTES
"Physical Therapy Treatment    Patient Name: Josh Schaeffer  MRN: [id number]  Today's Date: 7/11/2024  Time Calculation  Start Time: 1601  Stop Time: 1630  Time Calculation (min): 29 min    Insurance  3 of 10    [employer] [place] Medicaid  PA needed >30v thee yr thru [employer]   0% coins, no ded/oop, no copay, 30v per thee yr (0v used as of 6/27/2024), no PA     Current Problem  1. Lumbar spondylosis  Follow Up In Physical Therapy      2. Back stiffness  Follow Up In Physical Therapy      3. Back pain  Follow Up In Physical Therapy      4. Muscle weakness  Follow Up In Physical Therapy          Subjective    General   Pt reports he felt good following previous visit with beginning the pool activities.  \"I got to use muscles that I was too afraid to.\"  States he is \"feeling great\" today.-  Minimal c/o pain.  States he has been improving since the surgery.  Reports he is applying his own restrictions, such as the \"gallon of milk rule\" & trying not to lift more than the weight of that.  He is using the \"golfer technique\" to pick things up from the floor (states this was discussed at previous visit).     Precautions  Precautions  Precautions Comment: Pt reports no falls since what was reported at initial visit.    Pain  Pain Assessment  Pain Assessment: 0-10  0-10 (Numeric) Pain Score: 4  Pain Location: Back  Pain Orientation: Left, Lower  Pain Descriptors: Discomfort  Clinical Progression: Gradually improving  Effect of Pain on Daily Activities: Negotiating stairs, getting in/out of truck, walking the dog    Objective   Denies having any numbness or tingling    Treatments:  Aquatic Therapy (30123): 29 Minutes, 2 Units    Activities:  Aquatic gait forward/Bwd/Lateral 3x laps each   March 3x laps  HS/GS Stretches (GS only today ) 2x30 --> Add HS Stretch NV  Hip 3 way x15 (flex/abduction/ext today)   Squats x20  DLS 3 way : Paddles Lv5, x10 each  Trunk Rotation with noodle: 5\" x5 each  Noodle Pull down: x20  DB white and " "black  SL abduction/flex x15 each   SKTC 2x30        Assessment:   Reviewed activities initiated first aquatic visit.  Pt exhibits good recall of the activities & completes with good tolerance.  Occasional vc'ing for improved technique with good follow through observed.  He has good motivation for POC.  Able to progress this visit with addition of DLS (paddle ex's) & noodle rotation to the activities performed to increase mobility, strength & stability.  Reports being \"able to feel\" the rotations & reports some tightness limiting motion.  Able to complete.  He exhibits good ability to complete paddle ex's.  He expressed being pleased with progress to this point.  Anticipate he will be able to continue progressing with aquatic activities, as tolerated, per protocol.    Plan:   Continue to progress with aquatic activities as tolerated, per protocol, to increase core strength, stability & improve overall functional mobility, capacity to return to PLOF with daily activities.    "

## 2024-07-14 NOTE — PROGRESS NOTES
Subjective       Patient ID: HPI    Burning on urination --  Pain on urination  --  Urinary frequency  __  Every 1-2 hours __, every 2-3 hours __ every 3-4 hours __  Urinary urgency --  Urge incontinence --  Urinary stress incontinence  __   Pads changed per day -- 1-2 __, 2-3 __, 3-4__ 5 +__  Nocturia--  1-2 times __, 2 - 3 times __, 3-4 times __, greater than 5 times__  Hematuria --  Hesitancy --  Post void fullness --     Review of Systems  General-- No C/O fever or chills  Head-- No C/O Dizziness  Eyes-- NO  C/O blurry or double vision  Ears-- No C/O hearing loss  Neck-- Supple  Chest-- No C/O pain or discomfort  Lungs-- No C/O shortness of breath  Abdomen-- No C/O  pain or discomfort, No nausea or vomiting  Back-- No C/O back pain or discomfort  Extremities-- No C/O pain or swelling     Objective   Physical Exam  General-- well developed, well nourished in NAD  Head-- normal cephalic, atraumatic  Eyes-- PERRL, EOM'S FROM,  no  jaundice  Neck-- Supple, without masses  Chest-- Normal bony structure  Abdomen-- soft, non tender, liver spleen not tender. No supra pubic masses  Back-- no flank masses palpable, no CVA tenderness on palpation or perc;ussion  Lymph nodes-- No inguinal lymphadenopathy noted  Prostate--  Testis-- both down, non tender, without masses  Epididymis-- no masses palpable  Scrotum -- no hydrocele noted  Extremities -- Normal muscle mass and tone for the patients age  Neurological-- oriented times three    Subjective   3-18-24  MRI of the L-S SPINE --   IMPRESSION:  Discogenic degenerative changes at L5-S1 with broad-based disc  protrusion to the left of midline. Disc material may contact the left  S1 nerve root.  The remainder of the lumbar spine is unremarkable     Assessment/Plan            Ron eYn MD 07/14/24 12:42 PM

## 2024-07-15 ENCOUNTER — OFFICE VISIT (OUTPATIENT)
Dept: UROLOGY | Facility: CLINIC | Age: 39
End: 2024-07-15
Payer: MEDICAID

## 2024-07-15 ENCOUNTER — TREATMENT (OUTPATIENT)
Dept: PHYSICAL THERAPY | Facility: CLINIC | Age: 39
End: 2024-07-15
Payer: MEDICAID

## 2024-07-15 VITALS
TEMPERATURE: 98 F | HEART RATE: 78 BPM | DIASTOLIC BLOOD PRESSURE: 78 MMHG | SYSTOLIC BLOOD PRESSURE: 125 MMHG | BODY MASS INDEX: 30.1 KG/M2 | HEIGHT: 72 IN | WEIGHT: 222.2 LBS | RESPIRATION RATE: 16 BRPM

## 2024-07-15 DIAGNOSIS — G89.29 CHRONIC PAIN IN TESTICLE: Primary | ICD-10-CM

## 2024-07-15 DIAGNOSIS — M54.9 BACK PAIN: ICD-10-CM

## 2024-07-15 DIAGNOSIS — M25.69 BACK STIFFNESS: ICD-10-CM

## 2024-07-15 DIAGNOSIS — M62.81 MUSCLE WEAKNESS: ICD-10-CM

## 2024-07-15 DIAGNOSIS — N50.812 PAIN IN LEFT TESTICLE: ICD-10-CM

## 2024-07-15 DIAGNOSIS — Z13.89 SCREENING FOR BLOOD OR PROTEIN IN URINE: ICD-10-CM

## 2024-07-15 DIAGNOSIS — N50.819 CHRONIC PAIN IN TESTICLE: Primary | ICD-10-CM

## 2024-07-15 DIAGNOSIS — M47.816 LUMBAR SPONDYLOSIS: ICD-10-CM

## 2024-07-15 LAB
POC APPEARANCE, URINE: CLEAR
POC BILIRUBIN, URINE: NEGATIVE
POC BLOOD, URINE: NEGATIVE
POC COLOR, URINE: YELLOW
POC GLUCOSE, URINE: NEGATIVE MG/DL
POC KETONES, URINE: NEGATIVE MG/DL
POC LEUKOCYTES, URINE: NEGATIVE
POC NITRITE,URINE: NEGATIVE
POC PH, URINE: 7 PH
POC PROTEIN, URINE: NEGATIVE MG/DL
POC SPECIFIC GRAVITY, URINE: 1.02
POC UROBILINOGEN, URINE: 0.2 EU/DL

## 2024-07-15 PROCEDURE — 99214 OFFICE O/P EST MOD 30 MIN: CPT | Performed by: UROLOGY

## 2024-07-15 PROCEDURE — 99204 OFFICE O/P NEW MOD 45 MIN: CPT | Performed by: UROLOGY

## 2024-07-15 PROCEDURE — 97113 AQUATIC THERAPY/EXERCISES: CPT | Mod: CQ,GP

## 2024-07-15 PROCEDURE — 1036F TOBACCO NON-USER: CPT | Performed by: UROLOGY

## 2024-07-15 PROCEDURE — 81003 URINALYSIS AUTO W/O SCOPE: CPT | Mod: QW | Performed by: UROLOGY

## 2024-07-15 RX ORDER — AMITRIPTYLINE HYDROCHLORIDE 10 MG/1
10 TABLET, FILM COATED ORAL NIGHTLY
Qty: 90 TABLET | Refills: 3 | Status: SHIPPED | OUTPATIENT
Start: 2024-07-15 | End: 2025-07-10

## 2024-07-15 SDOH — ECONOMIC STABILITY: FOOD INSECURITY: WITHIN THE PAST 12 MONTHS, YOU WORRIED THAT YOUR FOOD WOULD RUN OUT BEFORE YOU GOT MONEY TO BUY MORE.: NEVER TRUE

## 2024-07-15 SDOH — ECONOMIC STABILITY: FOOD INSECURITY: WITHIN THE PAST 12 MONTHS, THE FOOD YOU BOUGHT JUST DIDN'T LAST AND YOU DIDN'T HAVE MONEY TO GET MORE.: NEVER TRUE

## 2024-07-15 ASSESSMENT — PAIN - FUNCTIONAL ASSESSMENT: PAIN_FUNCTIONAL_ASSESSMENT: 0-10

## 2024-07-15 ASSESSMENT — LIFESTYLE VARIABLES
HOW OFTEN DO YOU HAVE A DRINK CONTAINING ALCOHOL: NEVER
AUDIT-C TOTAL SCORE: 0
SKIP TO QUESTIONS 9-10: 1
HOW MANY STANDARD DRINKS CONTAINING ALCOHOL DO YOU HAVE ON A TYPICAL DAY: PATIENT DOES NOT DRINK
HOW OFTEN DO YOU HAVE SIX OR MORE DRINKS ON ONE OCCASION: NEVER

## 2024-07-15 ASSESSMENT — ENCOUNTER SYMPTOMS
OCCASIONAL FEELINGS OF UNSTEADINESS: 0
DEPRESSION: 0
LOSS OF SENSATION IN FEET: 0

## 2024-07-15 ASSESSMENT — PATIENT HEALTH QUESTIONNAIRE - PHQ9
2. FEELING DOWN, DEPRESSED OR HOPELESS: NOT AT ALL
SUM OF ALL RESPONSES TO PHQ9 QUESTIONS 1 AND 2: 0
1. LITTLE INTEREST OR PLEASURE IN DOING THINGS: NOT AT ALL

## 2024-07-15 ASSESSMENT — PAIN DESCRIPTION - DESCRIPTORS: DESCRIPTORS: DULL

## 2024-07-15 ASSESSMENT — PAIN SCALES - GENERAL: PAINLEVEL: 0-NO PAIN

## 2024-07-15 NOTE — PROGRESS NOTES
Subjective   Patient ID: Josh Schaeffer is a 39 y.o. male who presents for chronic testicular pain (Patient presents for  chronic testicular pain.  ).  HPI    Review of Systems    Objective   Physical Exam    Assessment/Plan            Ron Yen MD 07/15/24 9:59 AM

## 2024-07-15 NOTE — PROGRESS NOTES
"Physical Therapy Treatment    Patient Name: Josh Schaeffer  MRN: [id number]  Today's Date: 7/15/2024  Time Calculation  Start Time: 1138  Stop Time: 1213  Time Calculation (min): 35 min    Insurance  4 of 10     [employer] [place] Medicaid  PA needed >30v thee yr thru [employer]   0% coins, no ded/oop, no copay, 30v per thee yr (0v used as of 2024), no PA        Current Problem  1. Lumbar spondylosis  Follow Up In Physical Therapy      2. Back stiffness  Follow Up In Physical Therapy      3. Back pain  Follow Up In Physical Therapy      4. Muscle weakness  Follow Up In Physical Therapy          Subjective    General   Pt arrived late for scheduled appt secondary to having another appt this morning.  Pt reports meeting with neurologist this morning.  States they told him he had nerve damage & nothing they could do.  He reports being in the pool does help to reduce sx's.  \"It's the one main thing I knew was going to help.\"     Pain  Pain Assessment  Pain Assessment: 0-10  Pain Location: Back  Pain Orientation: Left, Lower  Pain Descriptors: Dull (\"like a deep bruise that is healing\")  Clinical Progression: Gradually improving  Effect of Pain on Daily Activities: Negotiating stairs, getting in/out of truck, walking the dog    Objective   Pt denies any numbness or tingling    Treatments:  Aquatic Therapy (65423): 35 Minutes, 2 Units    Activities:  Aquatic gait forward/Bwd/Lateral 3x laps each   March 3x laps  Hip 3 way x15 (flex/abduction/ext today)   Squats x20  Push ups into ladder: --> Add NV  DLS 3 way : Paddles Lv5, x10 each  Staggered Stance Row: Paddles, Lv5, 2x10 (switch stance at 10)  Core Stir: --> Add NV  Trunk Rotation with noodle: 5\" x5 each  Noodle Pull down: x20  DB white and black  SL abduction/flex x15 each (not this visit)  SKTC 2x30   Noodle Han minutes on lg noodle  HS Stretch: 30\" x2  Hip Flexor Stretch: 30\"x 2       Assessment:   Pt able to continue with activities today to reduce sx's, " increase core strength, stability & increase flexibility.  Added staggered stance row, HS stretch, hip flexor stretch & noodle hang to the activities performed today. He completes with some difficulty secondary overall weakness/instability & decreased flexibility, but without increased sx's.  Good tolerance for remaining activities as well.  Occasional vc'ing for improved form, core & posture with good follow through observed.  Reports noodle hang helped to relieve some pressure in lower spine.  Overall, he tolerated session well & exhibits good ability to continue with POC as tolerated.    Plan:    Continue to progress with aquatic activities as tolerated, per protocol, to increase core strength, stability & improve overall functional mobility, capacity to return to PLOF with daily activities.

## 2024-07-15 NOTE — PROGRESS NOTES
Subjective   Patient ID: Josh Schaeffer is a 39 y.o. male who presents for chronic testicular pain (Patient presents for  chronic testicular pain.  ).  HPI  Pt presents for evaluation of chronic testicular pain.  Pt has had multiple procedures on the left cord including nerve blocks, nerve denervation, and has undergone L-S Disc surgery with no real help in his sxs.  Pain comes and goes.  At time is quite severe even to light touch.  Pt  developed L-S DDD  while in the [employer] and is currently on disability secondary to the pain.     Review of Systems  General-- No C/O fever or chills  Head-- No C/O Dizziness  Eyes-- NO  C/O blurry or double vision  Ears-- No C/O hearing loss  Neck-- Supple  Chest-- No C/O pain or discomfort  Lungs-- No C/O shortness of breath  Abdomen-- No C/O  pain or discomfort, No nausea or vomiting  Back-- OCC back pain and  discomfort  Extremities-- No C/O swelling or pain      Objective   Physical Exam    General-- well developed, well nourished in NAD  Head-- normal cephalic, atraumatic  Eyes-- PERRL, EOM'S FROM,  no  jaundice  Neck-- Supple, without masses  Chest-- Normal bony structure  Abdomen-- soft, non tender, liver spleen not tender. No supra pubic masses  Back-- no flank masses palpable, no CVA tenderness on palpation or perc;ussion  Lymph nodes-- No inguinal lymphadenopathy noted  Prostate-- 1+, firm, non tender, without masses or nodules   Testis-- both down, 6-7 + tenderness on palpation of both testis, without masses  Epididymis-- no masses palpable  Scrotum -- no hydrocele noted  Extremities -- Normal muscle mass and tone for the patients age  Neurological-- oriented times three    Assessment/Plan     Testicular pain secondary to L-S DDD   PT S/P MULTIPLE PROCEDURES IE:, CORD BLOCKS, CORD DENERVATION, L-S  SPINE SURGERY  5-22-24 WITH LITTLE TO NO RELIEF IN HIS SX'S  PATHOPHYSIOLOGY AND OPTIONS OF THERAPY DISCUSSED  ALL QUESTIONS ANSWERED     P:  Trial of elavil 10 mg at hs-- will  increase the dose over time  FOLLOW UP  in 3-4 weeeks to see how he is doing          Ron Yen MD 07/15/24 10:00 AM

## 2024-07-18 ENCOUNTER — TREATMENT (OUTPATIENT)
Dept: PHYSICAL THERAPY | Facility: CLINIC | Age: 39
End: 2024-07-18
Payer: MEDICAID

## 2024-07-18 DIAGNOSIS — M54.9 BACK PAIN: ICD-10-CM

## 2024-07-18 DIAGNOSIS — M47.816 LUMBAR SPONDYLOSIS: ICD-10-CM

## 2024-07-18 DIAGNOSIS — M62.81 MUSCLE WEAKNESS: ICD-10-CM

## 2024-07-18 DIAGNOSIS — M25.69 BACK STIFFNESS: ICD-10-CM

## 2024-07-18 PROCEDURE — 97113 AQUATIC THERAPY/EXERCISES: CPT | Mod: CQ,GP

## 2024-07-18 ASSESSMENT — PAIN DESCRIPTION - DESCRIPTORS: DESCRIPTORS: DULL

## 2024-07-18 ASSESSMENT — PAIN - FUNCTIONAL ASSESSMENT: PAIN_FUNCTIONAL_ASSESSMENT: 0-10

## 2024-07-18 ASSESSMENT — PAIN SCALES - GENERAL: PAINLEVEL_OUTOF10: 3

## 2024-07-18 NOTE — PROGRESS NOTES
"Physical Therapy Treatment    Patient Name: Josh Schaeffer  MRN: [id number]  Today's Date: 2024  Time Calculation  Start Time: 1530  Stop Time: 1615  Time Calculation (min): 45 min    Insurance  5 of 10     [employer] [place] [employer]  PA needed >30v thee yr thru [employer]   0% coins, no ded/oop, no copay, 30v per thee yr (0v used as of 2024), no PA        Current Problem  1. Lumbar spondylosis  Follow Up In Physical Therapy      2. Back stiffness  Follow Up In Physical Therapy      3. Back pain  Follow Up In Physical Therapy      4. Muscle weakness  Follow Up In Physical Therapy          Subjective    General   Pt doing well today.  He continues to report good response to aquatic POC.       Precautions  Precautions  Precautions Comment: No recent falls reported    Pain  Pain Assessment  Pain Assessment: 0-10  0-10 (Numeric) Pain Score: 3  Pain Location: Back  Pain Orientation: Left, Lower  Pain Descriptors: Dull  Clinical Progression: Gradually improving  Effect of Pain on Daily Activities: Negotiating stairs, getting in/out of truck, walking the dog    Objective   Pt denies any numbness or tingling     Treatments:  Aquatic Therapy (65279): 45 Minutes, 3 Units    Activities:  Aquatic gait forward/Bwd/Lateral 3x laps each   March 3x laps  Hip 3 way x15 each ZI (flex/abduction/ext today)   Squats x20  Push ups into ladder: x15  DLS 3 way : Paddles Lv5, x10 each  Staggered Stance Row: Paddles, Lv5, 2x10 (switch stance at 10)  Core Stir: Paddles, Lv5 x10 each cw, ccw  Trunk Rotation with noodle: 5\" x5 each  Noodle Push down: x20  DB white and black  SL abduction/flex x15 each (not this visit)  SKTC 2x30   Noodle Han minutes on lg noodle  Noodle Bicycle: 1 minute  HS Stretch: 30\" x2 (not this visit)  Hip Flexor Stretch: 30\"x 2 (not this visit)     Assessment:   Pt continues to do well with aquatic activities & progressing in core strength, stability.  He is completing up to 45 minutes of activity in pool " with good tolerance.  Progressed activities today with addition of ladder push up, noodle bicycle & core stir to further improve strength, stability.  No increase in sx's with activities performed.  Demonstrates good ability to continue progressing POC as tolerated.    Plan:     Continue to progress with aquatic activities as tolerated, per protocol, to increase core strength, stability & improve overall functional mobility, capacity to return to PLOF with daily activities.

## 2024-07-19 ENCOUNTER — OFFICE VISIT (OUTPATIENT)
Dept: UROLOGY | Age: 39
End: 2024-07-19

## 2024-07-19 VITALS
HEART RATE: 95 BPM | HEIGHT: 72 IN | SYSTOLIC BLOOD PRESSURE: 106 MMHG | WEIGHT: 223 LBS | DIASTOLIC BLOOD PRESSURE: 72 MMHG | OXYGEN SATURATION: 98 % | BODY MASS INDEX: 30.2 KG/M2

## 2024-07-19 DIAGNOSIS — N50.812 PAIN IN LEFT TESTICLE: Primary | ICD-10-CM

## 2024-07-19 LAB
BILIRUBIN, POC: NORMAL
BLOOD URINE, POC: NORMAL
CLARITY, POC: CLEAR
COLOR, POC: YELLOW
GLUCOSE URINE, POC: NORMAL
KETONES, POC: NORMAL
LEUKOCYTE EST, POC: NORMAL
NITRITE, POC: NORMAL
PH, POC: 5.5
PROTEIN, POC: NORMAL
SPECIFIC GRAVITY, POC: >1.03
UROBILINOGEN, POC: 0.2

## 2024-07-19 RX ORDER — BACLOFEN 20 MG/1
20 TABLET ORAL 3 TIMES DAILY PRN
COMMUNITY
Start: 2024-07-01

## 2024-07-19 RX ORDER — AMITRIPTYLINE HYDROCHLORIDE 10 MG/1
TABLET, FILM COATED ORAL
COMMUNITY
Start: 2024-07-15

## 2024-07-19 RX ORDER — NALOXONE HYDROCHLORIDE 4 MG/.1ML
SPRAY NASAL
COMMUNITY
Start: 2024-05-28

## 2024-07-19 RX ORDER — OXYCODONE HYDROCHLORIDE AND ACETAMINOPHEN 5; 325 MG/1; MG/1
1 TABLET ORAL 2 TIMES DAILY PRN
COMMUNITY
Start: 2024-07-03

## 2024-07-19 ASSESSMENT — ENCOUNTER SYMPTOMS: APNEA: 0

## 2024-07-19 NOTE — PROGRESS NOTES
Subjective:      Patient ID: Mike Leiva is a 39 y.o. male    HPI 39 year old male who presents for complaints of left sided scrotal pain. He states that he has had this pain since 2019. He has had back surgery early this year.  He complains of occasional burning with urination. He denies gross hematuria and fevers.     Past Medical History:   Diagnosis Date    Anxiety and depression 4/3/2024    Epididymitis     EtOH dependence (HCC)     Hypertension      Past Surgical History:   Procedure Laterality Date    CARPAL TUNNEL RELEASE Bilateral 2020    HERNIA REPAIR      LUMBAR SPINE SURGERY N/A 2024    LUMBAR SPINE L5-S1 LEFT MICRODISCECTOMY PRONE, AXIS TABLE, NEW MICROSCOPE, NEW C-ARM, LUMBAR BRACE, GLOBUS EQUIPMENT MIS TUBES-e-mailed Berto mendez performed by Deni Hernández MD at [facility] OR     Social History     Socioeconomic History    Marital status:      Spouse name: None    Number of children: None    Years of education: None    Highest education level: None   Tobacco Use    Smoking status: Former     Current packs/day: 0.00     Types: Cigarettes     Quit date: 2016     Years since quittin.6    Smokeless tobacco: Never   Substance and Sexual Activity    Alcohol use: Not Currently     Alcohol/week: 2.0 standard drinks of alcohol     Types: 2 Cans of beer per week    Drug use: Yes     Types: Marijuana (Weed), Opiates     Sexual activity: Yes     Partners: Female     Social Determinants of Health     Financial Resource Strain: Medium Risk (4/3/2024)    Overall Financial Resource Strain (CARDIA)     Difficulty of Paying Living Expenses: Somewhat hard   Food Insecurity: Food Insecurity Present (4/3/2024)    Hunger Vital Sign     Worried About Running Out of Food in the Last Year: Sometimes true     Ran Out of Food in the Last Year: Sometimes true   Transportation Needs: Unmet Transportation Needs (4/3/2024)    PRAPARE - Transportation     Lack of Transportation (Non-Medical):

## 2024-07-22 ENCOUNTER — TREATMENT (OUTPATIENT)
Dept: PHYSICAL THERAPY | Facility: CLINIC | Age: 39
End: 2024-07-22
Payer: MEDICAID

## 2024-07-22 DIAGNOSIS — M62.81 MUSCLE WEAKNESS: ICD-10-CM

## 2024-07-22 DIAGNOSIS — M54.9 BACK PAIN: ICD-10-CM

## 2024-07-22 DIAGNOSIS — M25.69 BACK STIFFNESS: ICD-10-CM

## 2024-07-22 DIAGNOSIS — M47.816 LUMBAR SPONDYLOSIS: ICD-10-CM

## 2024-07-22 PROCEDURE — 97113 AQUATIC THERAPY/EXERCISES: CPT | Mod: GP,CQ

## 2024-07-22 NOTE — PROGRESS NOTES
"Patient Name: Josh Schaeffer  MRN: [id number]  Today's Date: 2024  Time Calculation  Start Time: 0900  Stop Time: 30  Time Calculation (min): 30 min  Insurance  6 of 10     Subjective:  Reports that aquatics seems to be helping unload LB.    Objective:  2/10 pain level with 30 minutes of aquatic exercise    Assessment:   Decreased pain intensity with performance of aquatic exercises.  Cues for trunk posture at times . However good awareness with proper body mechanics with movements.     Plan:   3 more aquatic then re eval with supervising PT.    Treatment :   Aquatic gait forward/Bwd/Lateral 3x laps each   March 3x laps  Hip 3 way x15 each ZI (flex/abduction/ext today)   Squats x20  Push ups into ladder: x15  DLS 3 way : Paddles Lv5, x10 each  Staggered Stance Row: Paddles, Lv5, 2x10 (switch stance at 10)  Core Stir: Paddles, Lv5 x10 each cw, ccw  Trunk Rotation with noodle: 5\" x5 each  Noodle Push down: x20  DB double teal + Y SL ? abduction/flex x10 each   SKTC 2x30   Noodle Han minutes on lg noodle  Noodle Bicycle: 1 minute NV   HS Stretch: 30\" x2          Current Problem:  1. Lumbar spondylosis  Follow Up In Physical Therapy      2. Back stiffness  Follow Up In Physical Therapy      3. Back pain  Follow Up In Physical Therapy      4. Muscle weakness  Follow Up In Physical Therapy              Pain:  4.5/10    Location: [facility]             "

## 2024-07-25 ENCOUNTER — APPOINTMENT (OUTPATIENT)
Dept: PHYSICAL THERAPY | Facility: CLINIC | Age: 39
End: 2024-07-25
Payer: MEDICAID

## 2024-07-29 ENCOUNTER — APPOINTMENT (OUTPATIENT)
Dept: PHYSICAL THERAPY | Facility: CLINIC | Age: 39
End: 2024-07-29
Payer: MEDICAID

## 2024-08-01 PROBLEM — F41.9 ANXIETY AND DEPRESSION: Status: ACTIVE | Noted: 2024-04-03

## 2024-08-01 PROBLEM — F32.A ANXIETY AND DEPRESSION: Status: ACTIVE | Noted: 2024-04-03

## 2024-08-01 PROBLEM — I10 HYPERTENSION: Status: ACTIVE | Noted: 2024-04-03

## 2024-08-01 PROBLEM — M54.50 LUMBAR SPINE PAIN: Status: ACTIVE | Noted: 2024-04-03

## 2024-08-01 RX ORDER — PRAZOSIN HYDROCHLORIDE 5 MG/1
1 CAPSULE ORAL NIGHTLY
COMMUNITY
Start: 2024-04-08 | End: 2025-04-09

## 2024-08-07 ENCOUNTER — APPOINTMENT (OUTPATIENT)
Dept: PHYSICAL THERAPY | Facility: CLINIC | Age: 39
End: 2024-08-07
Payer: MEDICAID

## 2024-08-12 ENCOUNTER — APPOINTMENT (OUTPATIENT)
Dept: UROLOGY | Facility: CLINIC | Age: 39
End: 2024-08-12
Payer: MEDICAID

## 2024-08-14 ENCOUNTER — TREATMENT (OUTPATIENT)
Dept: PHYSICAL THERAPY | Facility: CLINIC | Age: 39
End: 2024-08-14
Payer: MEDICAID

## 2024-08-14 DIAGNOSIS — M47.816 LUMBAR SPONDYLOSIS: ICD-10-CM

## 2024-08-14 DIAGNOSIS — M54.9 BACK PAIN: ICD-10-CM

## 2024-08-14 DIAGNOSIS — M25.69 BACK STIFFNESS: ICD-10-CM

## 2024-08-14 DIAGNOSIS — M62.81 MUSCLE WEAKNESS: ICD-10-CM

## 2024-08-14 PROCEDURE — 97110 THERAPEUTIC EXERCISES: CPT | Mod: GP | Performed by: GENERAL ACUTE CARE HOSPITAL

## 2024-08-14 NOTE — PROGRESS NOTES
Patient Name: Josh Schaeffer  MRN: [id number]  Today's Date: 8/14/2024     Current Problem:  1. Lumbar spondylosis  Follow Up In Physical Therapy      2. Back stiffness  Follow Up In Physical Therapy      3. Back pain  Follow Up In Physical Therapy      4. Muscle weakness  Follow Up In Physical Therapy          Visit 7/17    Subjective:  Change in pain/ pain level: 3-4/10 current  6/10 worst- lower lumbar   Change in function: feel better after aquatic  Patient comments: no longer getting cramping in the posterior leg    Relief stretching/gym work   Objective:   Flexibility ( R/L):     [id number]     CANDY -/-     FADIR+/+     OBERS-/-     Piriformis-/-     Mod Tony+/+     Rectus+/+    Strength:     Upper Abdominals:   4/5               Transverse Abdominis: Poor     Multifidus:   Poor     Pelvic Floor: Poor     Bridge:  3 /3     Hamstring Dominant       Hip Flexion (R/L):  5/5  5 /5     Hip Abduction:   5/5  4+ /5     Hip External Rot (Clamshell):   5/5   4+/5     Hip Extension:   5/5   5/5                Knee Ext (R/L):   5 /5,  5/5     Knee Flex (R/L):   5/5,  5/5     Ankle DF (R/L): 5  /5, 5 /5    Treatment:    Therapeutic exercise (81331):  Access Code: [email or web address]  URL: [email or web address]  Date: 08/14/2024  Prepared by: Hernan Rene    Exercises  - Bird Dog  - 1-2 x daily - 7 x weekly - 10 reps - 5 hold  - Quadruped Hip Hike on Foam  - 1-2 x daily - 7 x weekly - 10 reps - 10 hold  - Supine Multifidus with Heel Press  - 3 x daily - 7 x weekly - 10 reps - 10 hold    Assessment:  Functional progression can be further made by continuing physical therapy with skilled interventions provided by a physical therapist. The patient has demonstrated sustained progress toward functional goals and would benefit from continued skilled services to further attain documented goals. The patient requires education and training to ensure optimal outcomes and reach remaining functional goals. The  remaining functional goals are expected to be met in a reasonable time frame.  Patient appears motivated and compliant this date, demonstrating a working understanding of principals instructed and home program.  Plan:  Progress with functional strength as tolerated with respect to tissue healing. Manual therapy PRN to improve/maintain ROM, prevent adhesion, reduce pain.

## 2024-08-15 ENCOUNTER — APPOINTMENT (OUTPATIENT)
Dept: UROLOGY | Facility: CLINIC | Age: 39
End: 2024-08-15
Payer: MEDICAID

## 2024-09-04 ENCOUNTER — TREATMENT (OUTPATIENT)
Dept: PHYSICAL THERAPY | Facility: CLINIC | Age: 39
End: 2024-09-04
Payer: MEDICARE

## 2024-09-04 DIAGNOSIS — M54.9 BACK PAIN: ICD-10-CM

## 2024-09-04 DIAGNOSIS — M25.69 BACK STIFFNESS: ICD-10-CM

## 2024-09-04 DIAGNOSIS — M47.816 LUMBAR SPONDYLOSIS: Primary | ICD-10-CM

## 2024-09-04 DIAGNOSIS — M62.81 MUSCLE WEAKNESS: ICD-10-CM

## 2024-09-04 PROCEDURE — 97113 AQUATIC THERAPY/EXERCISES: CPT | Mod: GP,CQ

## 2024-09-04 NOTE — PROGRESS NOTES
"Patient Name: Josh Schaeffer  MRN: [id number]  Today's Date: 2024  Time Calculation  Start Time: 08  Stop Time: 835  Time Calculation (min): 33 min  Insurance  8 of 10  Subjective:  States that he has resumed previous gym activities with no increased pain or symptoms.  Performing some cardio and equipment machines . No bending lifting or twisting per pt. PT reports that he plans on leaving out of state post completion of PT .    Objective:  Fair + DLS     Assessment:   Progressed core stabilization with improved control. Cues for trunk postioning with L LE movement to improve  neutral pelvis  postioning .    Plan:   Deep water introduction exercises NV     Treatment :   Aquatic gait forward/Bwd/Lateral 3x laps each   March 3x laps  Raffy /piriformis 1 x30  Prayer stretch at ladder x30 seconds each   Hip 3 way x15 each ZI (flex/abduction/ext today)   Squats x20  Push ups into ladder: x15 NV   DLS 3 way : Paddles Lv5, x10 each NV   Staggered Stance Row: Paddles, Lv5, 2x10 (switch stance at 10)  Core Stir: Paddles, Lv5 x10 each cw, ccw NV   Trunk Rotation with noodle: 5\" x5 each nv  Noodle Push down: x20  DB double teal + Y SL abduction/flex x20 each   SKTC 2x30   Noodle Han minutes on lg noodle  Noodle Bicycle: 1 minute NV   HS Stretch: 30\" x2   Plank hold 5 seconds x10   Plank with hip lifts (small range ) x10   SL toe fwd taps on wall for TA x10          Current Problem:  1. Lumbar spondylosis  Follow Up In Physical Therapy      2. Back stiffness  Follow Up In Physical Therapy      3. Back pain  Follow Up In Physical Therapy      4. Muscle weakness  Follow Up In Physical Therapy              Pain:  3/10   Location: [facility]                "

## 2024-09-11 ENCOUNTER — APPOINTMENT (OUTPATIENT)
Dept: PHYSICAL THERAPY | Facility: CLINIC | Age: 39
End: 2024-09-11
Payer: MEDICARE

## 2024-09-25 ENCOUNTER — APPOINTMENT (OUTPATIENT)
Dept: PHYSICAL THERAPY | Facility: CLINIC | Age: 39
End: 2024-09-25
Payer: MEDICARE

## 2024-10-09 ENCOUNTER — APPOINTMENT (OUTPATIENT)
Dept: PHYSICAL THERAPY | Facility: CLINIC | Age: 39
End: 2024-10-09
Payer: MEDICARE

## 2024-10-23 ENCOUNTER — APPOINTMENT (OUTPATIENT)
Dept: PHYSICAL THERAPY | Facility: CLINIC | Age: 39
End: 2024-10-23
Payer: MEDICARE

## 2024-10-30 ENCOUNTER — APPOINTMENT (OUTPATIENT)
Dept: PHYSICAL THERAPY | Facility: CLINIC | Age: 39
End: 2024-10-30
Payer: MEDICARE

## (undated) DEVICE — STRIP,CLOSURE,WOUND,MEDI-STRIP,1/2X4: Brand: MEDLINE

## (undated) DEVICE — 3M™ IOBAN™ 2 ANTIMICROBIAL INCISE DRAPE 6651EZ: Brand: IOBAN™ 2

## (undated) DEVICE — SPONGE,NEURO,.75"X.75",XR,STRL,LF,10/PK: Brand: MEDLINE

## (undated) DEVICE — Device

## (undated) DEVICE — E-Z CLEAN, NON-STICK, PTFE COATED, ELECTROSURGICAL BLADE ELECTRODE, MODIFIED EXTENDED INSULATION, 4 INCH (10.2 CM): Brand: MEGADYNE

## (undated) DEVICE — SPONGE,DISSECTOR,K,XRAY,9/16"X1/4",STRL: Brand: MEDLINE

## (undated) DEVICE — NEURO: Brand: MEDLINE INDUSTRIES, INC.

## (undated) DEVICE — COVER,TABLE,44X90,STERILE: Brand: MEDLINE

## (undated) DEVICE — 3M™ TEGADERM™ TRANSPARENT FILM DRESSING FRAME STYLE, 1626W, 4 IN X 4-3/4 IN (10 CM X 12 CM), 50/CT 4CT/CASE: Brand: 3M™ TEGADERM™

## (undated) DEVICE — 4-PORT MANIFOLD: Brand: NEPTUNE 2

## (undated) DEVICE — FRAZIER SUCTION INSTRUMENT 12 FR W/CONTROL VENT & OBTURATOR: Brand: FRAZIER

## (undated) DEVICE — KIT JACK TBL PT CARE

## (undated) DEVICE — 3M™ TEGADERM™ TRANSPARENT FILM DRESSING FRAME STYLE, 1624W, 2-3/8 IN X 2-3/4 IN (6 CM X 7 CM), 100/CT 4CT/CASE: Brand: 3M™ TEGADERM™

## (undated) DEVICE — GLOVE ORANGE PI 7 1/2   MSG9075

## (undated) DEVICE — GOWN,SIRUS,NONRNF,SETINSLV,2XL,18/CS: Brand: MEDLINE

## (undated) DEVICE — PROBE PEDCL L165MM CANN W/ MOD JAMSH NDL NO2 MOD MOD

## (undated) DEVICE — APPLICATOR MEDICATED 26 CC SOLUTION HI LT ORNG CHLORAPREP

## (undated) DEVICE — DISSECTOR LAP DIA5MM BLNT TIP ENDOPATH

## (undated) DEVICE — SPONGE GZ W4XL4IN COT 12 PLY TYP VII WVN C FLD DSGN STERILE

## (undated) DEVICE — BLADE ES L6IN ELASTOMERIC COAT INSUL DURABLE BEND UPTO

## (undated) DEVICE — FLOSEAL WITH RECOTHROM - 10ML.: Brand: FLOSEAL HEMOSTATIC MATRIX

## (undated) DEVICE — CATHETER IV 14GA L1.75IN OD2.146MM ID1.740MM ORNG VIALON

## (undated) DEVICE — PRECISION MATCH HEAD

## (undated) DEVICE — SYRINGE IRRIG 60ML SFT PLIABLE BLB EZ TO GRP 1 HND USE W/

## (undated) DEVICE — LABEL MED MINI W/ MARKER

## (undated) DEVICE — ALCOHOL RUBBING ISO 16OZ 70%

## (undated) DEVICE — TAPE,CLOTH/SILK,CURAD,3"X10YD,LF,40/CS: Brand: CURAD

## (undated) DEVICE — PAD,NON-ADHERENT,3X8,STERILE,LF,1/PK: Brand: MEDLINE

## (undated) DEVICE — BLADE ES ELASTOMERIC COAT INSUL DURABLE BEND UPTO 90DEG

## (undated) DEVICE — SYRINGE MED 30ML STD CLR PLAS LUERLOCK TIP N CTRL DISP

## (undated) DEVICE — DRAPE,ISOLATION,INCISE,INVISISHIELD: Brand: MEDLINE

## (undated) DEVICE — 1010 S-DRAPE TOWEL DRAPE 10/BX: Brand: STERI-DRAPE™

## (undated) DEVICE — COVER MICSCP W46XL120IN 4 BINOC GLS LENS LEICA

## (undated) DEVICE — SPONGE DRN W4XL4IN RAYON/POLYESTER 6 PLY NONWOVEN PRECUT 2 PER PK